# Patient Record
Sex: FEMALE | Race: WHITE | Employment: FULL TIME | URBAN - METROPOLITAN AREA
[De-identification: names, ages, dates, MRNs, and addresses within clinical notes are randomized per-mention and may not be internally consistent; named-entity substitution may affect disease eponyms.]

---

## 2017-01-17 ENCOUNTER — TELEPHONE (OUTPATIENT)
Dept: GASTROENTEROLOGY | Facility: OUTPATIENT CENTER | Age: 57
End: 2017-01-17

## 2017-01-17 ENCOUNTER — HOSPITAL ENCOUNTER (OUTPATIENT)
Dept: MAMMOGRAPHY | Facility: CLINIC | Age: 57
Discharge: HOME | End: 2017-01-17
Attending: FAMILY MEDICINE | Admitting: FAMILY MEDICINE
Payer: COMMERCIAL

## 2017-01-17 DIAGNOSIS — Z12.31 ENCOUNTER FOR SCREENING MAMMOGRAM FOR BREAST CANCER: ICD-10-CM

## 2017-01-17 DIAGNOSIS — Z12.11 ENCOUNTER FOR SCREENING COLONOSCOPY: Primary | ICD-10-CM

## 2017-01-17 PROCEDURE — G0202 SCR MAMMO BI INCL CAD: HCPCS

## 2017-01-17 NOTE — TELEPHONE ENCOUNTER
Patient taking any blood thinners ? no    Heart disease ? denies    Lung disease ? denies      Sleep apnea ? denies    Diabetic ? denies    Kidney disease ? denies    Dialysis ? n/a    Electronic implanted medical devices ? denies    Are you taking any narcotic pain medication ? no   What is your daily dosage ?    PTSD ? n/a    Prep instructions reviewed with patient ? Pt. Declined review.  policy, MAC sedation plan reviewed. Advised pt. To have someone stay with her post exam    Pharmacy : [facility]    Indication for procedure : Screening colonoscopy    Referring provider : Dr. Dung marley

## 2017-01-19 ENCOUNTER — HOSPITAL ENCOUNTER (OUTPATIENT)
Dept: MAMMOGRAPHY | Facility: CLINIC | Age: 57
Discharge: HOME | End: 2017-01-19
Attending: FAMILY MEDICINE | Admitting: FAMILY MEDICINE
Payer: COMMERCIAL

## 2017-01-19 ENCOUNTER — HOSPITAL ENCOUNTER (OUTPATIENT)
Dept: MAMMOGRAPHY | Facility: CLINIC | Age: 57
End: 2017-01-19
Attending: FAMILY MEDICINE
Payer: COMMERCIAL

## 2017-01-19 DIAGNOSIS — R92.8 ABNORMAL MAMMOGRAM: ICD-10-CM

## 2017-01-19 PROCEDURE — 76642 ULTRASOUND BREAST LIMITED: CPT | Mod: RT

## 2017-01-19 PROCEDURE — 77061 BREAST TOMOSYNTHESIS UNI: CPT

## 2017-01-23 ENCOUNTER — TELEPHONE (OUTPATIENT)
Dept: FAMILY MEDICINE | Facility: CLINIC | Age: 57
End: 2017-01-23

## 2017-01-23 NOTE — TELEPHONE ENCOUNTER
Called patient to make sure she knew about following up in 6 months for US of breast to follow up on mass in R breast.  Patient was told at time of US and is aware.

## 2017-01-24 ENCOUNTER — TRANSFERRED RECORDS (OUTPATIENT)
Dept: HEALTH INFORMATION MANAGEMENT | Facility: CLINIC | Age: 57
End: 2017-01-24

## 2017-05-09 ENCOUNTER — OFFICE VISIT (OUTPATIENT)
Dept: FAMILY MEDICINE | Facility: CLINIC | Age: 57
End: 2017-05-09

## 2017-05-09 VITALS
RESPIRATION RATE: 16 BRPM | HEIGHT: 63 IN | DIASTOLIC BLOOD PRESSURE: 102 MMHG | OXYGEN SATURATION: 98 % | HEART RATE: 88 BPM | SYSTOLIC BLOOD PRESSURE: 150 MMHG | BODY MASS INDEX: 36.32 KG/M2 | WEIGHT: 205 LBS

## 2017-05-09 DIAGNOSIS — I10 ESSENTIAL HYPERTENSION WITH GOAL BLOOD PRESSURE LESS THAN 130/80: Primary | ICD-10-CM

## 2017-05-09 PROCEDURE — 99213 OFFICE O/P EST LOW 20 MIN: CPT | Performed by: FAMILY MEDICINE

## 2017-05-09 RX ORDER — HYDROCHLOROTHIAZIDE 25 MG/1
25 TABLET ORAL DAILY
Qty: 90 TABLET | Refills: 3 | Status: SHIPPED | OUTPATIENT
Start: 2017-05-09 | End: 2018-04-18

## 2017-05-09 NOTE — PROGRESS NOTES
Subjective:  Here to discuss the status of the following problem(s):(Unless noted the problem(s) is/are stable and if applicable the medicine(s) being used is/are causing no side effects or problems.)    CC: Hypertension and Recheck Medication      1. Essential hypertension with goal blood pressure less than 130/80  On losartan   Not checking bp no side effects        ROS:  General and CV completed and negative except as noted above    Past Medical History:   Diagnosis Date     Abnormal liver function test 2015    very slight abnormality     CARDIOVASCULAR SCREENING; LDL GOAL LESS THAN 160      Obesity (BMI 30-39.9)      Shift work sleep disorder 2015    saw sleep  [facility] [place]     Current Outpatient Prescriptions   Medication     Multiple Vitamins-Minerals (MULTIVITAMIN GUMMIES ADULT PO)     losartan (COZAAR) 100 MG tablet     No current facility-administered medications for this visit.       reports that she has never smoked. She has never used smokeless tobacco. She reports that she drinks alcohol. She reports that she does not use illicit drugs.      EXAM:  BP: 150/102   Pulse: 88      Wt Readings from Last 2 Encounters:   05/09/17 93 kg (205 lb)   11/15/16 87.5 kg (193 lb)       BMI= Body mass index is 36.9 kg/(m^2).    EXAM:   APPEARANCE: = alert, no distress and over weight  Conj/Eyelids = Nrl  Ears/Nose = Nrl  Neck = Nrl  Resp effort = Nrl  Breath Sounds = Nrl  Heart Rate, Rythym, & sounds (no Murm)  = Nrl  Digits and Nails =Nrl  SKIN absent significant rashes or lesions = Nrl  Recent/Remote Memory = Nrl  Mood/Affect = Nrl  Ext trace edema  Assessment/Plan:  Vera was seen today for hypertension and recheck medication.    Diagnoses and all orders for this visit    (I10) Essential hypertension with goal blood pressure less than 130/80  (primary encounter diagnosis)  Add to losartan 100 mg:   Plan: hydrochlorothiazide (HYDRODIURIL) 25 MG tablet,        Basic Metabolic Panel (8) ([facility])  discusse drenal and  potassium issue she assures me she will follow up for her lab testing in 2 weeks, see me in 4 weeks.   Girish Roberts  [facility]

## 2017-05-09 NOTE — MR AVS SNAPSHOT
After Visit Summary   5/9/2017    Vera Arellano    MRN: [id number]           Patient Information     Date Of Birth          1960        Visit Information        Provider Department      5/9/2017 12:45 PM Dung Roberts MD [facility]        Today's Diagnoses     Essential hypertension with goal blood pressure less than 130/80    -  1      Care Instructions      Taking Your Blood Pressure  Blood pressure is the force of blood as it moves from the heart through the blood vessels. You can take your own blood pressure reading using a digital monitor. Take readings as often as your doctor instructs. Take each reading at the same time of day.  Step 1. Relax      Wait at least a half-hour after smoking, eating, or exercising.    Sit comfortably at a table. Place the monitor near you.    Rest for a few minutes before you begin.  Step 2. Wrap the Cuff      Place your arm on the table, palm up. Your arm should be at the level of your heart. Wrap the cuff around your upper arm, just above your elbow. It s best done on bare skin, not over clothing.    Make sure your cuff fits. If it doesn t wrap around your upper arm, order a larger cuff.  Step 3. Inflate the Cuff      Pump the cuff until the scale reads 160. If you have a self-inflating cuff, push the button that starts the pump.    The cuff will tighten, then loosen.    The numbers will change. When they stop changing, your blood pressure reading will appear.    If you get a reading that is too high or too low for you, relax for a few minutes. Then do the test again.  Step 4. Write Down the Results      Write down your blood pressure numbers. Note the date and time. Keep your results in one place, such as a notebook.    Remove the cuff from your arm. Turn off the machine.    0845-8425 [employer]. [address] [place] [address], [place] [address]. All rights reserved. This information is not intended as a substitute for professional  "medical care. Always follow your healthcare professional's instructions.              Follow-ups after your visit        Your next 10 appointments already scheduled     Jun 06, 2017  8:30 AM CDT   [facility]    [address] [facility] [address] [phone]           Bring a current list of meds and any records pertaining to this visit.  For Physicals, please bring immunization records and any forms needing to be filled out.  Please arrive 10 minutes early to complete paperwork.              Future tests that were ordered for you today     Open Future Orders        Priority Expected Expires Ordered    Basic Metabolic Panel (8) ([facility]) Routine 5/23/2017 5/24/2017 5/9/2017            Who to contact     If you have questions or need follow up information about today's clinic visit or your schedule please contact [facility] directly at [phone].  Normal or non-critical lab and imaging results will be communicated to you by [employer], letter or phone within 4 business days after the clinic has received the results. If you do not hear from us within 7 days, please contact the clinic through [email or web address] or phone. If you have a critical or abnormal lab result, we will notify you by phone as soon as possible.  Submit refill requests through "[employer] or call your pharmacy and they will forward the refill request to us. Please allow 3 business days for your refill to be completed.          Additional Information About Your Visit        "[employer] Information     "[employer] lets you send messages to your doctor, view your test results, renew your prescriptions, schedule appointments and more. To sign up, go to [email or web address] . Click on \"Log in\" on the left side of the screen, which will take you to the Welcome page. Then click on \"Sign up Now\" on the right side of the page.     You will be asked to enter the " "access code listed below, as well as some personal information. Please follow the directions to create your username and password.     Your access code is: [email or web address]  Expires: 2017  1:22 PM     Your access code will  in 90 days. If you need help or a new code, please call your [facility] clinic or [phone].        Care EveryWhere ID     This is your Care EveryWhere ID. This could be used by other organizations to access your [facility] medical records  [id number]        Your Vitals Were     Pulse Respirations Height Last Period Pulse Oximetry BMI (Body Mass Index)    88 16 1.588 m (5' 2.5\") 2012 98% 36.9 kg/m2       Blood Pressure from Last 3 Encounters:   17 (!) 150/102   11/15/16 140/90   07/13/15 140/90    Weight from Last 3 Encounters:   17 93 kg (205 lb)   11/15/16 87.5 kg (193 lb)   07/13/15 95.6 kg (210 lb 12.8 oz)                 Today's Medication Changes          These changes are accurate as of: 17  1:22 PM.  If you have any questions, ask your nurse or doctor.               Start taking these medicines.        Dose/Directions    hydrochlorothiazide 25 MG tablet   Commonly known as:  HYDRODIURIL   Used for:  Essential hypertension with goal blood pressure less than 130/80   Started by:  Dung Roberts MD        Dose:  25 mg   Take 1 tablet (25 mg) by mouth daily   Quantity:  90 tablet   Refills:  3            Where to get your medicines      These medications were sent to [facility] - [address]  [address] [place] [address] [address]     Phone:  [phone]     hydrochlorothiazide 25 MG tablet                Primary Care Provider Office Phone # Fax #    Dung Roberts [phone]       [facility] [address]  [facility] [address]        Thank you!     Thank you for choosing [facility]  for your care. Our goal is always to provide you with excellent care. Hearing back from our " patients is one way we can continue to improve our services. Please take a few minutes to complete the written survey that you may receive in the mail after your visit with us. Thank you!             Your Updated Medication List - Protect others around you: Learn how to safely use, store and throw away your medicines at www.disposemymeds.org.          This list is accurate as of: 5/9/17  1:22 PM.  Always use your most recent med list.                   Brand Name Dispense Instructions for use    hydrochlorothiazide 25 MG tablet    HYDRODIURIL    90 tablet    Take 1 tablet (25 mg) by mouth daily       losartan 100 MG tablet    COZAAR    90 tablet    Take 1 tablet (100 mg) by mouth daily       MULTIVITAMIN GUMMIES ADULT PO      Take by mouth daily

## 2017-05-09 NOTE — PATIENT INSTRUCTIONS
Taking Your Blood Pressure  Blood pressure is the force of blood as it moves from the heart through the blood vessels. You can take your own blood pressure reading using a digital monitor. Take readings as often as your doctor instructs. Take each reading at the same time of day.  Step 1. Relax      Wait at least a half-hour after smoking, eating, or exercising.    Sit comfortably at a table. Place the monitor near you.    Rest for a few minutes before you begin.  Step 2. Wrap the Cuff      Place your arm on the table, palm up. Your arm should be at the level of your heart. Wrap the cuff around your upper arm, just above your elbow. It s best done on bare skin, not over clothing.    Make sure your cuff fits. If it doesn t wrap around your upper arm, order a larger cuff.  Step 3. Inflate the Cuff      Pump the cuff until the scale reads 160. If you have a self-inflating cuff, push the button that starts the pump.    The cuff will tighten, then loosen.    The numbers will change. When they stop changing, your blood pressure reading will appear.    If you get a reading that is too high or too low for you, relax for a few minutes. Then do the test again.  Step 4. Write Down the Results      Write down your blood pressure numbers. Note the date and time. Keep your results in one place, such as a notebook.    Remove the cuff from your arm. Turn off the machine.    3643-0647 [employer]. [address] [place] [address], [place] [address]. All rights reserved. This information is not intended as a substitute for professional medical care. Always follow your healthcare professional's instructions.

## 2017-05-19 DIAGNOSIS — I10 ESSENTIAL HYPERTENSION WITH GOAL BLOOD PRESSURE LESS THAN 130/80: ICD-10-CM

## 2017-05-19 PROCEDURE — 36415 COLL VENOUS BLD VENIPUNCTURE: CPT | Performed by: FAMILY MEDICINE

## 2017-05-19 PROCEDURE — 80048 BASIC METABOLIC PNL TOTAL CA: CPT | Mod: 90 | Performed by: FAMILY MEDICINE

## 2017-05-19 NOTE — LETTER
[facility] [address] [place]  [address]  Phone: [phone]    May 22, 2017      Vera Arellano  [address] [facility] [address]              Dear Vera,    The labs show normal potassium and kidney function. I will see you in a few weeks.         Sincerely,     Dung Mathews M.D.    Results for orders placed or performed in visit on 05/19/17   Basic Metabolic Panel (8) ([facility])   Result Value Ref Range    Glucose 91 65 - 99 mg/dL    Urea Nitrogen 16 6 - 24 mg/dL    Creatinine 0.92 0.57 - 1.00 mg/dL    eGFR If NonAfricn Am 71 >59 mL/min/1.73    eGFR If Africn Am 82 >59 mL/min/1.73    BUN/Creatinine Ratio 17 9 - 23    Sodium 138 134 - 144 mmol/L    Potassium 4.0 3.5 - 5.2 mmol/L    Chloride 95 (L) 96 - 106 mmol/L    Total CO2 27 18 - 28 mmol/L    Calcium 9.3 8.7 - 10.2 mg/dL    Narrative    Performed at:  01 - [facility] [address], [place], [place]  [id number]  : Yusuf Bergeron MD, Phone:  [phone]

## 2017-05-20 LAB
BUN SERPL-MCNC: 16 MG/DL (ref 6–24)
BUN/CREATININE RATIO: 17 (ref 9–23)
CALCIUM SERPL-MCNC: 9.3 MG/DL (ref 8.7–10.2)
CHLORIDE SERPLBLD-SCNC: 95 MMOL/L (ref 96–106)
CREAT SERPL-MCNC: 0.92 MG/DL (ref 0.57–1)
EGFR IF AFRICN AM: 82 ML/MIN/1.73
EGFR IF NONAFRICN AM: 71 ML/MIN/1.73
GLUCOSE SERPL-MCNC: 91 MG/DL (ref 65–99)
POTASSIUM SERPL-SCNC: 4 MMOL/L (ref 3.5–5.2)
SODIUM SERPL-SCNC: 138 MMOL/L (ref 134–144)
TOTAL CO2: 27 MMOL/L (ref 18–28)

## 2017-06-20 ENCOUNTER — OFFICE VISIT (OUTPATIENT)
Dept: FAMILY MEDICINE | Facility: CLINIC | Age: 57
End: 2017-06-20

## 2017-06-20 VITALS
BODY MASS INDEX: 36.43 KG/M2 | SYSTOLIC BLOOD PRESSURE: 122 MMHG | OXYGEN SATURATION: 98 % | HEART RATE: 92 BPM | WEIGHT: 202.4 LBS | RESPIRATION RATE: 16 BRPM | DIASTOLIC BLOOD PRESSURE: 82 MMHG

## 2017-06-20 DIAGNOSIS — I10 ESSENTIAL HYPERTENSION WITH GOAL BLOOD PRESSURE LESS THAN 130/80: Primary | ICD-10-CM

## 2017-06-20 PROCEDURE — 99213 OFFICE O/P EST LOW 20 MIN: CPT | Performed by: FAMILY MEDICINE

## 2017-06-20 NOTE — MR AVS SNAPSHOT
"              After Visit Summary   6/20/2017    Vera Arellano    MRN: [id number]           Patient Information     Date Of Birth          1960        Visit Information        Provider Department      6/20/2017 12:00 PM Dung Roberts MD [facility]        Today's Diagnoses     Essential hypertension with goal blood pressure less than 130/80    -  1       Follow-ups after your visit        Your next 10 appointments already scheduled     Jun 20, 2017  4:00 PM CDT   [facility]cell [facility]   [facility]    [address] [facility] [address] [phone]           Bring a current list of meds and any records pertaining to this visit.  For Physicals, please bring immunization records and any forms needing to be filled out.  Please arrive 10 minutes early to complete paperwork.              Who to contact     If you have questions or need follow up information about today's clinic visit or your schedule please contact [facility] directly at [phone].  Normal or non-critical lab and imaging results will be communicated to you by "[employer]hart, letter or phone within 4 business days after the clinic has received the results. If you do not hear from us within 7 days, please contact the clinic through [employer] or phone. If you have a critical or abnormal lab result, we will notify you by phone as soon as possible.  Submit refill requests through [employer] or call your pharmacy and they will forward the refill request to us. Please allow 3 business days for your refill to be completed.          Additional Information About Your Visit        MyChart Information     [employer] lets you send messages to your doctor, view your test results, renew your prescriptions, schedule appointments and more. To sign up, go to [email or web address] . Click on \"Log in\" on the left side of the screen, which will take you to the Welcome page. " "Then click on \"Sign up Now\" on the right side of the page.     You will be asked to enter the access code listed below, as well as some personal information. Please follow the directions to create your username and password.     Your access code is: [email or web address]  Expires: 2017  1:22 PM     Your access code will  in 90 days. If you need help or a new code, please call your [facility] clinic or [phone].        Care EveryWhere ID     This is your Care EveryWhere ID. This could be used by other organizations to access your [facility] medical records  [id number]        Your Vitals Were     Pulse Respirations Last Period Pulse Oximetry BMI (Body Mass Index)       92 16 2012 98% 36.43 kg/m2        Blood Pressure from Last 3 Encounters:   17 122/82   17 (!) 150/102   11/15/16 140/90    Weight from Last 3 Encounters:   17 91.8 kg (202 lb 6.4 oz)   17 93 kg (205 lb)   11/15/16 87.5 kg (193 lb)              Today, you had the following     No orders found for display       Primary Care Provider Office Phone # Fax #    Dung Roberts [phone]       [facility] [address] [facility] [address]        Thank you!     Thank you for choosing [facility]  for your care. Our goal is always to provide you with excellent care. Hearing back from our patients is one way we can continue to improve our services. Please take a few minutes to complete the written survey that you may receive in the mail after your visit with us. Thank you!             Your Updated Medication List - Protect others around you: Learn how to safely use, store and throw away your medicines at www.disposemymeds.org.          This list is accurate as of: 17 12:15 PM.  Always use your most recent med list.                   Brand Name Dispense Instructions for use    hydrochlorothiazide 25 MG tablet    HYDRODIURIL    90 tablet    Take 1 tablet (25 mg) by mouth daily    "    losartan 100 MG tablet    COZAAR    90 tablet    Take 1 tablet (100 mg) by mouth daily       MULTIVITAMIN GUMMIES ADULT PO      Take by mouth daily

## 2017-06-20 NOTE — PROGRESS NOTES
Subjective:  Here to discuss the status of the following problem(s):(Unless noted the problem(s) is/are stable and if applicable the medicine(s) being used is/are causing no side effects or problems.)    CC: Hypertension and Recheck Medication      1. Essential hypertension with goal blood pressure less than 130/80  She checks and it is < 140 /70  No problemms with meds , dizzy etc.       ROS:  General and CV completed and negative except as noted above    Past Medical History:   Diagnosis Date     Abnormal liver function test 2015    very slight abnormality     CARDIOVASCULAR SCREENING; LDL GOAL LESS THAN 160      Obesity (BMI 30-39.9)      Shift work sleep disorder 2015    saw sleep  [facility] [place]     Current Outpatient Prescriptions   Medication     hydrochlorothiazide (HYDRODIURIL) 25 MG tablet     Multiple Vitamins-Minerals (MULTIVITAMIN GUMMIES ADULT PO)     losartan (COZAAR) 100 MG tablet     No current facility-administered medications for this visit.       reports that she has never smoked. She has never used smokeless tobacco. She reports that she drinks alcohol. She reports that she does not use illicit drugs.      EXAM:  BP: 122/82   Pulse: 92      Wt Readings from Last 2 Encounters:   06/20/17 91.8 kg (202 lb 6.4 oz)   05/09/17 93 kg (205 lb)       BMI= Body mass index is 36.43 kg/(m^2).    EXAM:   APPEARANCE: = healthy, alert, no distress and over weight  Conj/Eyelids = Nrl  Ears/Nose = Nrl  Neck = Nrl  Resp effort = Nrl  SKIN absent significant rashes or lesions = Nrl  Recent/Remote Memory = Nrl  Mood/Affect = Nrl    Assessment/Plan:  Vera was seen today for hypertension and recheck medication.    Diagnoses and all orders for this visit:    Essential hypertension with goal blood pressure less than 130/80    BP GOOD CONTROL , FOLLOW UP FOR ANNUAL EXAM.   ALSO GET SHORT TERM MAMM AS REC.          Dung Roberts  [facility]

## 2017-07-12 ENCOUNTER — HOSPITAL ENCOUNTER (OUTPATIENT)
Dept: MAMMOGRAPHY | Facility: CLINIC | Age: 57
Discharge: HOME | End: 2017-07-12
Attending: FAMILY MEDICINE | Admitting: FAMILY MEDICINE
Payer: COMMERCIAL

## 2017-07-12 DIAGNOSIS — N63.10 BREAST MASS, RIGHT: ICD-10-CM

## 2017-07-12 PROCEDURE — 76642 ULTRASOUND BREAST LIMITED: CPT | Mod: RT

## 2017-09-25 ENCOUNTER — OFFICE VISIT (OUTPATIENT)
Dept: FAMILY MEDICINE | Facility: CLINIC | Age: 57
End: 2017-09-25

## 2017-09-25 VITALS
TEMPERATURE: 98.2 F | OXYGEN SATURATION: 98 % | RESPIRATION RATE: 16 BRPM | SYSTOLIC BLOOD PRESSURE: 150 MMHG | HEIGHT: 63 IN | BODY MASS INDEX: 35.79 KG/M2 | WEIGHT: 202 LBS | DIASTOLIC BLOOD PRESSURE: 104 MMHG | HEART RATE: 106 BPM

## 2017-09-25 DIAGNOSIS — I10 ESSENTIAL HYPERTENSION WITH GOAL BLOOD PRESSURE LESS THAN 130/80: ICD-10-CM

## 2017-09-25 DIAGNOSIS — R05.9 COUGH: ICD-10-CM

## 2017-09-25 DIAGNOSIS — H61.21 IMPACTED CERUMEN OF RIGHT EAR: Primary | ICD-10-CM

## 2017-09-25 PROCEDURE — 99214 OFFICE O/P EST MOD 30 MIN: CPT | Performed by: FAMILY MEDICINE

## 2017-09-25 RX ORDER — BENZONATATE 200 MG/1
200 CAPSULE ORAL 3 TIMES DAILY PRN
Qty: 21 CAPSULE | Refills: 0 | Status: SHIPPED | OUTPATIENT
Start: 2017-09-25 | End: 2019-10-08

## 2017-09-25 RX ORDER — AZITHROMYCIN 250 MG/1
TABLET, FILM COATED ORAL
Qty: 6 TABLET | Refills: 0 | Status: SHIPPED | OUTPATIENT
Start: 2017-09-25 | End: 2018-07-24

## 2017-09-25 NOTE — MR AVS SNAPSHOT
"              After Visit Summary   9/25/2017    Vera Arellano    MRN: [id number]           Patient Information     Date Of Birth          1960        Visit Information        Provider Department      9/25/2017 1:30 PM Edith Girard MD [facility]        Today's Diagnoses     Impacted cerumen of right ear    -  1    Cough          Care Instructions    zpack    Right ear wash    F/u 2 weeks if not better  And get CBC/CXR    Schedule your pap               Follow-ups after your visit        Future tests that were ordered for you today     Open Future Orders        Priority Expected Expires Ordered    CBC with Diff/Plt ([facility]) Routine  9/25/2018 9/25/2017    X-ray Chest 2 vws* Routine 9/25/2017 9/25/2018 9/25/2017            Who to contact     If you have questions or need follow up information about today's clinic visit or your schedule please contact [facility] directly at [phone].  Normal or non-critical lab and imaging results will be communicated to you by [employer], letter or phone within 4 business days after the clinic has received the results. If you do not hear from us within 7 days, please contact the clinic through [employer] or phone. If you have a critical or abnormal lab result, we will notify you by phone as soon as possible.  Submit refill requests through [employer] or call your pharmacy and they will forward the refill request to us. Please allow 3 business days for your refill to be completed.          Additional Information About Your Visit        [employer] Information     [employer] lets you send messages to your doctor, view your test results, renew your prescriptions, schedule appointments and more. To sign up, go to [email or web address] . Click on \"Log in\" on the left side of the screen, which will take you to the Welcome page. Then click on \"Sign up Now\" on the right side of the page.     You will be asked to enter the access code listed below, as well as some " "personal information. Please follow the directions to create your username and password.     Your access code is: [email or web address]  Expires: 2017  1:57 PM     Your access code will  in 90 days. If you need help or a new code, please call your [place] clinic or [phone].        Care EveryWhere ID     This is your Care EveryWhere ID. This could be used by other organizations to access your [place] medical records  [id number]        Your Vitals Were     Pulse Temperature Respirations Height Last Period Pulse Oximetry    106 98.2  F (36.8  C) (Oral) 16 1.588 m (5' 2.5\") 2012 98%    BMI (Body Mass Index)                   36.36 kg/m2            Blood Pressure from Last 3 Encounters:   17 (!) 150/104   17 122/82   17 (!) 150/102    Weight from Last 3 Encounters:   17 91.6 kg (202 lb)   17 91.8 kg (202 lb 6.4 oz)   17 93 kg (205 lb)              We Performed the Following     Removal Impacted Cerumen Irrigation Unilateral (Ear Wash)          Today's Medication Changes          These changes are accurate as of: 17  1:57 PM.  If you have any questions, ask your nurse or doctor.               Start taking these medicines.        Dose/Directions    azithromycin 250 MG tablet   Commonly known as:  ZITHROMAX   Used for:  Cough   Started by:  Edith Girard MD        Two tablets first day, then one tablet daily for four days.   Quantity:  6 tablet   Refills:  0       benzonatate 200 MG capsule   Commonly known as:  TESSALON   Used for:  Cough   Started by:  Edith Girard MD        Dose:  200 mg   Take 1 capsule (200 mg) by mouth 3 times daily as needed for cough   Quantity:  21 capsule   Refills:  0            Where to get your medicines      These medications were sent to [facility] - [address]  [address] [place] [address]     Phone:  [phone]     azithromycin 250 MG tablet    benzonatate 200 MG " capsule                Primary Care Provider Office Phone # Fax #    Dung Roberts [phone] [address]  [facility] [address]        Equal Access to Services     PAPA MATAMOROS : Hadjeremy kamini alejo miguelinao Soomaali, waaxda luqadaha, qaybta kaalmada adeegyada, donna wubernardo spaulding. So [facility] [phone].    ATENCIÓN: Si habla español, tiene a barber disposición servicios gratuitos de asistencia lingüística. Llame al [phone].    We comply with applicable federal civil rights laws and [place] laws. We do not discriminate on the basis of race, color, national origin, age, disability sex, sexual orientation or gender identity.            Thank you!     Thank you for choosing [facility]  for your care. Our goal is always to provide you with excellent care. Hearing back from our patients is one way we can continue to improve our services. Please take a few minutes to complete the written survey that you may receive in the mail after your visit with us. Thank you!             Your Updated Medication List - Protect others around you: Learn how to safely use, store and throw away your medicines at www.disposemymeds.org.          This list is accurate as of: 9/25/17  1:57 PM.  Always use your most recent med list.                   Brand Name Dispense Instructions for use Diagnosis    azithromycin 250 MG tablet    ZITHROMAX    6 tablet    Two tablets first day, then one tablet daily for four days.    Cough       benzonatate 200 MG capsule    TESSALON    21 capsule    Take 1 capsule (200 mg) by mouth 3 times daily as needed for cough    Cough       hydrochlorothiazide 25 MG tablet    HYDRODIURIL    90 tablet    Take 1 tablet (25 mg) by mouth daily    Essential hypertension with goal blood pressure less than 130/80       losartan 100 MG tablet    COZAAR    90 tablet    Take 1 tablet (100 mg) by mouth daily    Essential hypertension with goal blood pressure less than  130/80       MULTIVITAMIN GUMMIES ADULT PO      Take by mouth daily

## 2017-09-25 NOTE — PROGRESS NOTES
Cc cold for one month    HCM  Living will- Declined   Flu vaccine- declined, pt reports that she felt sick, nauseated with vomiting after getting flu vaccine, about 2 years ago.   Pap due - patient states that she discussed with Dr. Roberts and will get one next year.   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[employer]  Hobbies: reading, dog park  Travel no  Exposure no    RESPIRATORY SYMPTOMS- non smoker; non-productive cough with fatigue      Duration: 2 months     Description  Dry nasal congestion, rhinorrhea, dry cough, fatigue/malaise and hoarse voice, sleep issues due to cough which occurs intermittent, appetite - normal, no fevers, no N/V/D, no wheezing, occasional SOB when coughing     Severity: moderate    Accompanying signs and symptoms: see above     History (predisposing factors):  None, no COPD, no asthma, never smoker     Precipitating or alleviating factors: None    Therapies tried and outcome:  Nyquil - not effective     10 years ago similar episode. Pertussis. Zpack.    Never smoked    ETOH 2 times a month some wine with dinner  Street drugs/MJ no  Caffeine yes 2 coffee/day    Sleep 4-8 hours, nocturia no  Appetite ok  Exercise: Walking with dog, cannot walk as long.    Very tired.  Night sweats no  Weight loss no            Problem list and histories reviewed & adjusted, as indicated.  Additional history: as documented    Patient Active Problem List   Diagnosis     Obesity     Essential hypertension with goal blood pressure less than 130/80     Past Surgical History:   Procedure Laterality Date     BUNIONECTOMY      in  her 20s       Social History   Substance Use Topics     Smoking status: Never Smoker     Smokeless tobacco: Never Used     Alcohol use 0.0 - 1.2 oz/week     0 - 2 Standard drinks or equivalent per week      Comment: once per month     Family History   Problem  "Relation Age of Onset     Asthma Mother      house fire history long before that     Other - See Comments Sister      suicide     Chronic Obstructive Pulmonary Disease Brother      Chronic Obstructive Pulmonary Disease Brother          Current Outpatient Prescriptions   Medication Sig Dispense Refill     hydrochlorothiazide (HYDRODIURIL) 25 MG tablet Take 1 tablet (25 mg) by mouth daily 90 tablet 3     Multiple Vitamins-Minerals (MULTIVITAMIN GUMMIES ADULT PO) Take by mouth daily       losartan (COZAAR) 100 MG tablet Take 1 tablet (100 mg) by mouth daily 90 tablet 3     Allergies   Allergen Reactions     Codeine Nausea and Vomiting     Morphine Nausea and Vomiting     Percocet [Oxycodone-Acetaminophen] Nausea and Vomiting     Lisinopril Cough     Recent Labs   Lab Test  05/19/17   1612  11/15/16   1400  04/13/15   1643  03/02/15   0907  03/02/15   0901   01/26/12   0942   A1C   --    --   5.9   --    --    --    --    LDL   --   172*   --   124*   --    --   153*   HDL   --   68   --   66   --    --   57   TRIG   --   110   --   149   --    --   89   ALT   --   25  63*   --   29   --    --    CR  0.92  0.86   --    --   0.70   < >   --    POTASSIUM  4.0  4.4   --    --   4.3   < >   --    TSH   --    --    --    --    --    --   1.71    < > = values in this interval not displayed.      BP Readings from Last 3 Encounters:   09/25/17 (!) 150/104   06/20/17 122/82   05/09/17 (!) 150/102    Wt Readings from Last 3 Encounters:   09/25/17 91.6 kg (202 lb)   06/20/17 91.8 kg (202 lb 6.4 oz)   05/09/17 93 kg (205 lb)       Estimated body mass index is 36.36 kg/(m^2) as calculated from the following:    Height as of this encounter: 1.588 m (5' 2.5\").    Weight as of this encounter: 91.6 kg (202 lb).  Weight loss is recommended    Needs to recheck BP 1-2 weeks- not at goal 140/90 or less.           Labs reviewed in EPIC          Reviewed and updated as needed this visit by clinical staff     Reviewed and updated as needed " "this visit by Provider         ROS:  Constitutional, HEENT, cardiovascular, pulmonary, GI, , musculoskeletal, neuro, skin, endocrine and psych systems are negative, except as otherwise noted.  Dry cough    Postmenopausal      OBJECTIVE:   LMP 08/17/2012 BP (!) 150/104  Pulse 106  Temp 98.2  F (36.8  C) (Oral)  Resp 16  Ht 1.588 m (5' 2.5\")  Wt 91.6 kg (202 lb)  LMP 08/17/2012  SpO2 98%  BMI 36.36 kg/m2    There is no height or weight on file to calculate BMI.  GENERAL: healthy, alert and no distress  EYES: Eyes grossly normal to inspection, PERRL and conjunctivae and sclerae normal  HENT: ear canals and TM's normal left and cerumen right, nose and mouth without ulcers or lesions  NECK: no adenopathy, no asymmetry, masses, or scars and thyroid normal to palpation  RESP: lungs clear to auscultation - no rales, rhonchi or wheezes Deep cough heard once or twice during exam period.  CV: regular rate and rhythm, normal S1 S2, no S3 or S4, no murmur, click or rub, no peripheral edema and peripheral pulses strong  No krystal/cords  ABDOMEN: soft, nontender, no hepatosplenomegaly, no masses and bowel sounds normal  MS: no gross musculoskeletal defects noted, no edema  SKIN: no suspicious lesions or rashes  NEURO: Normal strength and tone, mentation intact and speech normal  PSYCH: mentation appears normal, affect normal/bright  LYMPH: no cervical, supraclavicular, axillary, or inguinal adenopathy    Diagnostic Test Results:  Results for orders placed or performed during the hospital encounter of 07/12/17   US Breast Right    Narrative    Right breast ultrasound    Comparisons: 1/19/2017    History: Follow-up of probably benign findings in the right breast.    FINDINGS:     Focused ultrasound of the right breast was performed by  radiologist and technologist.    Small hypoechoic oval circumscribed mass is again seen at the 7:00  position, 4 cm from the nipple on the right. This is not significantly  changed since " 1/19/2017.      Impression    IMPRESSION: BI-RADS CATEGORY: 3 - Probably Benign Finding-Short  Interval Follow-Up Suggested    RECOMMENDED FOLLOW-UP: Short Interval Follow-up 6 Months.    Follow-up with repeat right breast ultrasound at the time of patient's  routine annual bilateral mammography.    The patient was given the results of the examination.    FLORAEBONY SHARMA       ASSESSMENT/PLAN:     ASSESSMENT / PLAN:  (H61.21) Impacted cerumen of right ear  (primary encounter diagnosis)  Comment:   Plan: Removal Impacted Cerumen Irrigation Unilateral         (Ear Wash)            (R05) Cough  Comment: she will return for lab and xray if not better in 2 weeks  Plan: CBC with Diff/Plt ([facility]), X-ray Chest 2 vws*,         azithromycin (ZITHROMAX) 250 MG tablet,         benzonatate (TESSALON) 200 MG capsule            Patient instructions:  zpack    Right ear wash    F/u 2 weeks if not better  And get CBC/CXR    Schedule your pap     Needs to recheck BP 1-2 weeks- not at goal 140/90 or less.        Edith Girard MD  [facility]

## 2017-11-19 ENCOUNTER — HEALTH MAINTENANCE LETTER (OUTPATIENT)
Age: 57
End: 2017-11-19

## 2018-01-09 DIAGNOSIS — I10 ESSENTIAL HYPERTENSION WITH GOAL BLOOD PRESSURE LESS THAN 130/80: ICD-10-CM

## 2018-01-11 RX ORDER — LOSARTAN POTASSIUM 100 MG/1
100 TABLET ORAL DAILY
Qty: 90 TABLET | Refills: 3 | Status: SHIPPED | OUTPATIENT
Start: 2018-01-11 | End: 2018-07-24

## 2018-04-18 DIAGNOSIS — I10 ESSENTIAL HYPERTENSION WITH GOAL BLOOD PRESSURE LESS THAN 130/80: ICD-10-CM

## 2018-04-18 RX ORDER — HYDROCHLOROTHIAZIDE 25 MG/1
25 TABLET ORAL DAILY
Qty: 30 TABLET | Refills: 0 | Status: SHIPPED | OUTPATIENT
Start: 2018-04-18 | End: 2018-07-24 | Stop reason: SINTOL

## 2018-07-24 ENCOUNTER — OFFICE VISIT (OUTPATIENT)
Dept: FAMILY MEDICINE | Facility: CLINIC | Age: 58
End: 2018-07-24

## 2018-07-24 VITALS
RESPIRATION RATE: 16 BRPM | HEIGHT: 63 IN | WEIGHT: 192 LBS | HEART RATE: 76 BPM | BODY MASS INDEX: 34.02 KG/M2 | DIASTOLIC BLOOD PRESSURE: 72 MMHG | OXYGEN SATURATION: 96 % | SYSTOLIC BLOOD PRESSURE: 124 MMHG

## 2018-07-24 DIAGNOSIS — I10 ESSENTIAL HYPERTENSION WITH GOAL BLOOD PRESSURE LESS THAN 130/80: ICD-10-CM

## 2018-07-24 DIAGNOSIS — Z12.4 PAP SMEAR FOR CERVICAL CANCER SCREENING: ICD-10-CM

## 2018-07-24 DIAGNOSIS — Z00.00 ROUTINE GENERAL MEDICAL EXAMINATION AT A HEALTH CARE FACILITY: Primary | ICD-10-CM

## 2018-07-24 PROCEDURE — 99396 PREV VISIT EST AGE 40-64: CPT | Performed by: NURSE PRACTITIONER

## 2018-07-24 RX ORDER — LOSARTAN POTASSIUM 100 MG/1
100 TABLET ORAL DAILY
Qty: 90 TABLET | Refills: 0 | Status: SHIPPED | OUTPATIENT
Start: 2018-07-24 | End: 2019-10-03

## 2018-07-24 NOTE — MR AVS SNAPSHOT
After Visit Summary   7/24/2018    Vera Arellano    MRN: [id number]           Patient Information     Date Of Birth          1960        Visit Information        Provider Department      7/24/2018 10:45 AM Sada Bacon APRN CNP [facility]        Today's Diagnoses     Routine general medical examination at a health care facility    -  1    Essential hypertension with goal blood pressure less than 130/80        Pap smear for cervical cancer screening          Care Instructions      Preventive Health Recommendations  Female Ages 50 - 64    Yearly exam: See your health care provider every year in order to  o Review health changes.   o Discuss preventive care.    o Review your medicines if your doctor has prescribed any.      Get a Pap test every three years (unless you have an abnormal result and your provider advises testing more often).    If you get Pap tests with HPV test, you only need to test every 5 years, unless you have an abnormal result.     You do not need a Pap test if your uterus was removed (hysterectomy) and you have not had cancer.    You should be tested each year for STDs (sexually transmitted diseases) if you're at risk.     Have a mammogram every 1 to 2 years.    Have a colonoscopy at age 50, or have a yearly FIT test (stool test). These exams screen for colon cancer.      Have a cholesterol test every 5 years, or more often if advised.    Have a diabetes test (fasting glucose) every three years. If you are at risk for diabetes, you should have this test more often.     If you are at risk for osteoporosis (brittle bone disease), think about having a bone density scan (DEXA).    Shots: Get a flu shot each year. Get a tetanus shot every 10 years.    Nutrition:     Eat at least 5 servings of fruits and vegetables each day.    Eat whole-grain bread, whole-wheat pasta and brown rice instead of white grains and rice.    Get adequate Calcium and Vitamin D.  "    Lifestyle    Exercise at least 150 minutes a week (30 minutes a day, 5 days a week). This will help you control your weight and prevent disease.    Limit alcohol to one drink per day.    No smoking.     Wear sunscreen to prevent skin cancer.     See your dentist every six months for an exam and cleaning.    See your eye doctor every 1 to 2 years.            Follow-ups after your visit        Follow-up notes from your care team     Return in about 4 weeks (around 2018) for Med check, Lab Work.      Who to contact     If you have questions or need follow up information about today's clinic visit or your schedule please contact [facility] directly at [phone].  Normal or non-critical lab and imaging results will be communicated to you by [employer], letter or phone within 4 business days after the clinic has received the results. If you do not hear from us within 7 days, please contact the clinic through [employer] or phone. If you have a critical or abnormal lab result, we will notify you by phone as soon as possible.  Submit refill requests through [employer] or call your pharmacy and they will forward the refill request to us. Please allow 3 business days for your refill to be completed.          Additional Information About Your Visit        [employer] Information     [employer] lets you send messages to your doctor, view your test results, renew your prescriptions, schedule appointments and more. To sign up, go to [email or web address] . Click on \"Log in\" on the left side of the screen, which will take you to the Welcome page. Then click on \"Sign up Now\" on the right side of the page.     You will be asked to enter the access code listed below, as well as some personal information. Please follow the directions to create your username and password.     Your access code is: [email or web address]  Expires: 10/22/2018 11:45 AM     Your access code will  in 90 days. If you need help or a new code, please call " "your [place] clinic or [phone].        Care EveryWhere ID     This is your Care EveryWhere ID. This could be used by other organizations to access your [place] medical records  [id number]        Your Vitals Were     Pulse Respirations Height Last Period Pulse Oximetry BMI (Body Mass Index)    76 16 1.588 m (5' 2.5\") 08/17/2012 96% 34.56 kg/m2       Blood Pressure from Last 3 Encounters:   07/24/18 124/72   09/25/17 (!) 150/104   06/20/17 122/82    Weight from Last 3 Encounters:   07/24/18 87.1 kg (192 lb)   09/25/17 91.6 kg (202 lb)   06/20/17 91.8 kg (202 lb 6.4 oz)              We Performed the Following     Pap IG rfx HPV ASCU ([facility])          Today's Medication Changes          These changes are accurate as of 7/24/18 11:45 AM.  If you have any questions, ask your nurse or doctor.               Stop taking these medicines if you haven't already. Please contact your care team if you have questions.     hydrochlorothiazide 25 MG tablet   Commonly known as:  HYDRODIURIL   Stopped by:  Sada Bacon APRN CNP                Where to get your medicines      These medications were sent to [facility] [place] - [address]  [address] [facility], [place] [address]     Phone:  [phone]     losartan 100 MG tablet                Primary Care Provider Office Phone # Fax #    ODELL Felix [phone] [address]  [facility] [address]        Equal Access to Services     [facility] : Hadii kamini lopez Soaubrey, waaxda luqadaha, qaybta kaalmadonna montoya. So [facility] [phone].    ATENCIÓN: Si habla español, tiene a barber disposición servicios gratuitos de asistencia lingüística. Delbert mcginnis [phone].    We comply with applicable federal civil rights laws and [place] laws. We do not discriminate on the basis of race, color, national origin, age, disability, sex, sexual orientation, or gender " identity.            Thank you!     Thank you for choosing [facility]  for your care. Our goal is always to provide you with excellent care. Hearing back from our patients is one way we can continue to improve our services. Please take a few minutes to complete the written survey that you may receive in the mail after your visit with us. Thank you!             Your Updated Medication List - Protect others around you: Learn how to safely use, store and throw away your medicines at www.disposemymeds.org.          This list is accurate as of 7/24/18 11:45 AM.  Always use your most recent med list.                   Brand Name Dispense Instructions for use Diagnosis    benzonatate 200 MG capsule    TESSALON    21 capsule    Take 1 capsule (200 mg) by mouth 3 times daily as needed for cough    Cough       losartan 100 MG tablet    COZAAR    90 tablet    Take 1 tablet (100 mg) by mouth daily    Essential hypertension with goal blood pressure less than 130/80       MULTIVITAMIN GUMMIES ADULT PO      Take by mouth daily

## 2018-07-24 NOTE — PROGRESS NOTES
SUBJECTIVE:   CC: Vera Arellano is an 57 year old woman who presents for preventive health visit.  Pt needs labs for HTN, will come back later for a separate visit. Does not want a charge today. One more refill given.     Healthy Habits:    Do you get at least three servings of calcium containing foods daily (dairy, green leafy vegetables, etc.)? yes    Amount of exercise or daily activities, outside of work: 3 day(s) per week, walks at [facility] with dog    Problems taking medications regularly No    Medication side effects: No    Have you had an eye exam in the past two years? no    Do you see a dentist twice per year? yes    Do you have sleep apnea, excessive snoring or daytime drowsiness? no      Mammo 12/5/17:  six month follow right breast mass bilateral diagnotic mammogram and right breast ultrasound due after 1-12-18. Pt does not wish to go there again since nothing definitive found on 3D mammo and US and was given no explanations.          Today's PHQ-2 Score:   PHQ-2 ( 1999 Pfizer) 11/15/2016   Q1: Little interest or pleasure in doing things 0   Q2: Feeling down, depressed or hopeless 0   PHQ-2 Score 0       Abuse: Current or Past(Physical, Sexual or Emotional)- No  Do you feel safe in your environment - Yes    Social History   Substance Use Topics     Smoking status: Never Smoker     Smokeless tobacco: Never Used     Alcohol use 0.0 - 1.2 oz/week     0 - 2 Standard drinks or equivalent per week      Comment: once per month     If you drink alcohol do you typically have >3 drinks per day or >7 drinks per week? No                     Reviewed orders with patient.  Reviewed health maintenance and updated orders accordingly - Yes    Patient over age 50, mutual decision to screen reflected in health maintenance.    Pertinent mammograms are reviewed under the imaging tab.  History of abnormal Pap smear: NO - age 30- 65 PAP every 3 years recommended  PAP / HPV 1/26/2012   PAP NIL     Reviewed and updated as  needed this visit by clinical staff         Reviewed and updated as needed this visit by Provider            ROS:  CONSTITUTIONAL: NEGATIVE for fever, chills, change in weight  INTEGUMENTARY/SKIN: NEGATIVE for worrisome rashes, moles or lesions  EYES: NEGATIVE for vision changes or irritation  ENT: NEGATIVE for ear, mouth and throat problems  RESP: NEGATIVE for significant cough or SOB  BREAST: NEGATIVE for masses, tenderness or discharge  CV: NEGATIVE for chest pain, palpitations or peripheral edema  GI: NEGATIVE for nausea, abdominal pain, heartburn, or change in bowel habits  : NEGATIVE for unusual urinary or vaginal symptoms. No vaginal bleeding.  MUSCULOSKELETAL: NEGATIVE for significant arthralgias or myalgia  NEURO: NEGATIVE for weakness, dizziness or paresthesias  PSYCHIATRIC: NEGATIVE for changes in mood or affect     OBJECTIVE:   LMP 08/17/2012  EXAM:  GENERAL APPEARANCE: healthy, alert and no distress  EYES: Eyes grossly normal to inspection, PERRL and conjunctivae and sclerae normal  HENT: ear canals and TM's normal, nose and mouth without ulcers or lesions, oropharynx clear and oral mucous membranes moist  NECK: no adenopathy, no asymmetry, masses, or scars and thyroid normal to palpation  RESP: lungs clear to auscultation - no rales, rhonchi or wheezes  BREAST: normal without masses, tenderness or nipple discharge and no palpable axillary masses or adenopathy  CV: regular rate and rhythm, normal S1 S2, no S3 or S4, no murmur, click or rub, no peripheral edema and peripheral pulses strong  ABDOMEN: soft, nontender, no hepatosplenomegaly, no masses and bowel sounds normal   (female): normal female external genitalia, normal urethral meatus, vaginal mucosal atrophy noted, normal cervix, adnexae, and uterus without masses or abnormal discharge  MS: no musculoskeletal defects are noted and gait is age appropriate without ataxia  SKIN: no suspicious lesions or rashes  NEURO: Normal strength and tone,  "sensory exam grossly normal, mentation intact and speech normal  PSYCH: mentation appears normal and affect normal/bright    Diagnostic Test Results:  none     ASSESSMENT/PLAN:   1. Routine general medical examination at a health care facility  Healthy female, cont to work on weight loss, exercise, and diet.    2. Essential hypertension with goal blood pressure less than 130/80  Will make appt soon for labs and HTN visit  - losartan (COZAAR) 100 MG tablet; Take 1 tablet (100 mg) by mouth daily  Dispense: 90 tablet; Refill: 0    3. Pap smear for cervical cancer screening  - Pap IG rfx HPV ASCU ([facility])    COUNSELING:   Reviewed preventive health counseling, as reflected in patient instructions       Regular exercise       Healthy diet/nutrition       Vision screening    BP Readings from Last 1 Encounters:   09/25/17 (!) 150/104     Estimated body mass index is 36.36 kg/(m^2) as calculated from the following:    Height as of 9/25/17: 1.588 m (5' 2.5\").    Weight as of 9/25/17: 91.6 kg (202 lb).      Weight management plan: Discussed healthy diet and exercise guidelines and patient will follow up in 12 months in clinic to re-evaluate.     reports that she has never smoked. She has never used smokeless tobacco.      Counseling Resources:  ATP IV Guidelines  Pooled Cohorts Equation Calculator  Breast Cancer Risk Calculator  FRAX Risk Assessment  ICSI Preventive Guidelines  Dietary Guidelines for Americans, 2010  USDA's MyPlate  ASA Prophylaxis  Lung CA Screening    [facility]  "

## 2018-07-24 NOTE — LETTER
[facility]  [address] [place]  [address]  Phone: [phone]    July 27, 2018      Vera Arellano  [address]  [facility] [address]              Dear Vera,    The results from your recent visit showed your pap was normal.        Sincerely,     Sada Bacon CNP    Results for orders placed or performed in visit on 07/24/18   Pap IG rfx HPV ASCU ([facility])   Result Value Ref Range    DIAGNOSIS: Comment     Specimen adequacy: Comment     Clinician Provided ICD10 Comment     Performed by: Comment     . .     Note: Comment     Test Methodology: Comment     . Comment     Narrative    Performed at:  [facility] - [facility]  [address], [place]  [id number]  : Luz Roberto MD, Phone:  [phone]  Specimen Comment: Source.............Cervix  Specimen Comment: LMP / Prev Treat...[id number];None  Specimen Comment: Other..............Post Menopausal  Specimen Comment: No. of containers..01 ThinPrep Vial

## 2018-07-27 LAB
.: NORMAL
.: NORMAL
CLINICIAN PROVIDED ICD10: NORMAL
DIAGNOSIS:: NORMAL
Lab: NORMAL
PERFORMED BY:: NORMAL
SPECIMEN ADEQUACY:: NORMAL
TEST METHODOLOGY:: NORMAL

## 2019-10-03 ENCOUNTER — HOSPITAL ENCOUNTER (EMERGENCY)
Facility: CLINIC | Age: 59
Discharge: HOME | End: 2019-10-03
Attending: EMERGENCY MEDICINE | Admitting: EMERGENCY MEDICINE
Payer: COMMERCIAL

## 2019-10-03 VITALS
RESPIRATION RATE: 14 BRPM | HEIGHT: 63 IN | BODY MASS INDEX: 33.66 KG/M2 | TEMPERATURE: 98.4 F | SYSTOLIC BLOOD PRESSURE: 175 MMHG | HEART RATE: 86 BPM | DIASTOLIC BLOOD PRESSURE: 124 MMHG | WEIGHT: 190 LBS | OXYGEN SATURATION: 98 %

## 2019-10-03 DIAGNOSIS — L30.9 DERMATITIS: ICD-10-CM

## 2019-10-03 PROCEDURE — 99282 EMERGENCY DEPT VISIT SF MDM: CPT

## 2019-10-03 RX ORDER — PREDNISONE 20 MG/1
TABLET ORAL
Qty: 10 TABLET | Refills: 0 | Status: SHIPPED | OUTPATIENT
Start: 2019-10-03 | End: 2019-10-08

## 2019-10-03 RX ORDER — LOSARTAN POTASSIUM 100 MG/1
100 TABLET ORAL DAILY
Qty: 7 TABLET | Refills: 0 | Status: SHIPPED | OUTPATIENT
Start: 2019-10-03 | End: 2019-10-08

## 2019-10-03 ASSESSMENT — ENCOUNTER SYMPTOMS
EYE ITCHING: 1
FACIAL SWELLING: 1
EYE REDNESS: 1

## 2019-10-03 ASSESSMENT — MIFFLIN-ST. JEOR: SCORE: 1415.96

## 2019-10-03 NOTE — ED PROVIDER NOTES
"  History     Chief Complaint:  Facial Swelling    The history is provided by the patient.      Vera Arellano is a 59 year old female who presents with facial swelling as well as pruritus and erythema of her eyebrows and eyes. Patient reports she had her eyebrows and eyelashes tinted two days ago and noticed progressive worsening symptoms since. Vera denies any burning sensations during tinting.  She has had this done before without any problems.  She reports she has been using Benadryl without relief and highlights she has had tinting completed multiple times previously.     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 Vera remarks she is seeing her primary in five days and requested refill for her 100 mg of Losartan in the interim as she has been off of this for some time.     Allergies:  Codeine  Morphine  Percocet  Lisinopril    Medications:    Cozaar    Past Medical History:    Hypertension  Obesity    Past Surgical History:    Right wrist ganglion cyst  Bunionectomy    Family History:    Asthma    Social History:  Never Smoker  Alcohol Use: Positive  Marital Status: Single     Review of Systems   HENT: Positive for facial swelling.    Eyes: Positive for redness and itching.   All other systems reviewed and are negative.    Physical Exam     Patient Vitals for the past 24 hrs:   BP Temp Temp src Pulse Heart Rate Resp SpO2 Height Weight   10/03/19 1201 (!) 175/124 -- -- 86 -- -- -- -- --   10/03/19 1123 (!) 186/104 -- -- -- -- -- -- -- --   10/03/19 1121 (!) 199/115 98.4  F (36.9  C) Oral 104 104 14 98 % 1.6 m (5' 3\") 86.2 kg (190 lb)     Physical Exam  Physical Exam   Constitutional:  Patient is oriented to person, place, and time. They appear well-developed and well-nourished. Mild distress secondary to swelling   HENT:    Patient has moderate facial erythema about both eyes with inflammation at both eyebrows. There is no purulence, pustules, blistering, or fluctuance.   Mouth/Throat: "   Oropharynx is clear and moist.   Eyes:    Mild conjunctival injection. EOM are normal. Pupils are equal, round, and reactive to light.   Neck:    Normal range of motion.   Cardiovascular: Normal rate, regular rhythm and normal heart sounds.  Exam reveals no gallop and no friction rub.  No murmur heard.  Pulmonary/Chest:  Effort normal and breath sounds normal. Patient has no wheezes. Patient has no rales.   Musculoskeletal:  Normal range of motion. Patient exhibits no edema.   Neurological:   Patient is alert and oriented to person, place, and time. Patient has normal strength. No cranial nerve deficit or sensory deficit. GCS 15  Skin:   Skin is warm and dry. No rash noted. No erythema.   Psychiatric:   Patient has a normal mood and affect. Patient's behavior is normal. Judgment and thought content normal.     Emergency Department Course     Emergency Department Course:  Nursing notes and vitals reviewed.  1142 I performed an exam of the patient as documented above. I answered all related questions prior to discharge, anticipatory guidance given.     Impression & Plan      Medical Decision Making:  Vera Arellano is a 59 year old female presenting with contact dermatitis from getting her eyebrows tinted. There is no evidence of periorbital, orbital cellulitis or abscess. At this point, I feel prednisone would be beneficial. She will continue to take Benadryl for the itching. Ice packing the face as well to help with the swelling. She incidentally has run out of her blood pressure and has a physical exam on Tuesday so asked for a bridging refill which I agreed to do after verifying her dose. At this point, the patient will follow-up with her primary care doctor regarding the contact dermatitis. Symptomatic cares were discussed.     Diagnosis:    ICD-10-CM   1. Dermatitis L30.9     Disposition: Home    Discharge Medications:  predniSONE 20 MG tablet  Commonly known as:  DELTASONE      Take two tablets (= 40mg) each  day for 5 (five) days  Quantity:  10 tablet  Refills:  0     losartan 100 MG tablet  Commonly known as:  COZAAR      Dose:  100 mg  Take 1 tablet (100 mg) by mouth daily for 7 days  Quantity:  7 tablet  Refills:  0       Scribe Disclosure:  Mateus RADER, am serving as a scribe at 11:37 AM on 10/3/2019 to document services personally performed by Maria G Santana MD based on my observations and the provider's statements to me.     EMERGENCY DEPARTMENT       Maria G Santana MD  10/07/19 8821

## 2019-10-03 NOTE — ED AVS SNAPSHOT
Emergency Department  [address] [place] [address]  Phone:  [phone]  Fax:  [phone]                                    Vera Arellano   MRN: [id number]    Department:   Emergency Department   Date of Visit:  10/3/2019           After Visit Summary Signature Page    I have received my discharge instructions, and my questions have been answered. I have discussed any challenges I see with this plan with the nurse or doctor.    ..........................................................................................................................................  Patient/Patient Representative Signature      ..........................................................................................................................................  Patient Representative Print Name and Relationship to Patient    ..................................................               ................................................  Date                                   Time    ..........................................................................................................................................  Reviewed by Signature/Title    ...................................................              ..............................................  Date                                               Time          22EPIC Rev 08/18

## 2019-10-03 NOTE — LETTER
October 3, 2019      To Whom It May Concern:      Vera Arellano was seen in our Emergency Department today, 10/03/19.  I expect her condition to improve over the next 2 days.  She may return to work/school when improved.    Sincerely,        Maria G Santana MD

## 2019-10-08 ENCOUNTER — OFFICE VISIT (OUTPATIENT)
Dept: FAMILY MEDICINE | Facility: CLINIC | Age: 59
End: 2019-10-08
Payer: COMMERCIAL

## 2019-10-08 VITALS
HEART RATE: 84 BPM | HEIGHT: 63 IN | TEMPERATURE: 98 F | BODY MASS INDEX: 35.17 KG/M2 | DIASTOLIC BLOOD PRESSURE: 104 MMHG | SYSTOLIC BLOOD PRESSURE: 147 MMHG | OXYGEN SATURATION: 99 % | RESPIRATION RATE: 16 BRPM | WEIGHT: 198.5 LBS

## 2019-10-08 DIAGNOSIS — I10 BENIGN ESSENTIAL HYPERTENSION: Primary | ICD-10-CM

## 2019-10-08 DIAGNOSIS — E78.5 HYPERLIPIDEMIA LDL GOAL <100: ICD-10-CM

## 2019-10-08 LAB
ALBUMIN SERPL-MCNC: 3.6 G/DL (ref 3.4–5)
ALP SERPL-CCNC: 122 U/L (ref 40–150)
ALT SERPL W P-5'-P-CCNC: 63 U/L (ref 0–50)
ANION GAP SERPL CALCULATED.3IONS-SCNC: 6 MMOL/L (ref 3–14)
AST SERPL W P-5'-P-CCNC: 28 U/L (ref 0–45)
BILIRUB SERPL-MCNC: 0.8 MG/DL (ref 0.2–1.3)
BUN SERPL-MCNC: 21 MG/DL (ref 7–30)
CALCIUM SERPL-MCNC: 8.7 MG/DL (ref 8.5–10.1)
CHLORIDE SERPL-SCNC: 105 MMOL/L (ref 94–109)
CHOLEST SERPL-MCNC: 237 MG/DL (ref 0–200)
CHOLEST/HDLC SERPL: 2.9 {RATIO} (ref 0–5)
CO2 SERPL-SCNC: 28 MMOL/L (ref 20–32)
CREAT SERPL-MCNC: 0.83 MG/DL (ref 0.52–1.04)
FASTING SPECIMEN: YES
GFR SERPL CREATININE-BSD FRML MDRD: 78 ML/MIN/{1.73_M2}
GLUCOSE SERPL-MCNC: 85 MG/DL (ref 70–99)
HDLC SERPL-MCNC: 82 MG/DL
LDLC SERPL CALC-MCNC: 120 MG/DL (ref 0–129)
POTASSIUM SERPL-SCNC: 3.9 MMOL/L (ref 3.4–5.3)
PROT SERPL-MCNC: 7.1 G/DL (ref 6.8–8.8)
SODIUM SERPL-SCNC: 138 MMOL/L (ref 133–144)
TRIGL SERPL-MCNC: 177 MG/DL (ref 0–150)
VLDL-CHOLESTEROL: 35 (ref 7–32)

## 2019-10-08 RX ORDER — LOSARTAN POTASSIUM 100 MG/1
100 TABLET ORAL DAILY
Qty: 90 TABLET | Refills: 1 | Status: SHIPPED | OUTPATIENT
Start: 2019-10-08 | End: 2019-11-12

## 2019-10-08 RX ORDER — HYDROCHLOROTHIAZIDE 12.5 MG/1
12.5 TABLET ORAL DAILY
Qty: 30 TABLET | Refills: 1 | Status: SHIPPED | OUTPATIENT
Start: 2019-10-08 | End: 2019-11-12

## 2019-10-08 ASSESSMENT — MIFFLIN-ST. JEOR: SCORE: 1454.39

## 2019-10-08 NOTE — PATIENT INSTRUCTIONS
Use zyrtec or claritin once daily for the next week.  Can take additional dose of benadryl at night.    Welcome to [facility], where we are committed to the art of inspired primary care.  Thank you for choosing us to be a part of your well-being.    The clinic is open Monday through Friday, 8:00 a.m. - 5:00 p.m., and Saturdays from 8:00 a.m. - 12:00 p.m.  After-hours questions are directed to our 24-hour nurse line, which can be reached by calling the clinic at [phone].  You can also contact the clinic through Reppler, our online patient portal.  (Please allow 1-2 business days for a response via Reppler.)  If you are not already enrolled in Reppler, access instructions are below.    If you need a refill on your prescription, please contact the pharmacy where you filled it, and they will contact our clinic with the details of what is needed.  If your prescription is a controlled substance, you will have a conversation with your provider to determine if you would like to  your prescription at the clinic or have it mailed to your pharmacy.  Please allow 2-3 business days for all refill requests to be handled.    We look forward to providing you with great care!  Please let me know if you have any questions.  Thank you for allowing me to participate in your care.  It was my pleasure meeting you.  Ashley Rose PA-C      Patient Education     Eating Heart-Healthy Food: Using the DASH Plan    Eating for your heart doesn t have to be hard or boring. You just need to know how to make healthier choices. The DASH eating plan has been developed to help you do just that. DASH stands for Dietary Approaches to Stop Hypertension. It is a plan that has been proven to be healthier for your heart and to lower your risk for high blood pressure. It can also help lower your risk for cancer, heart disease, osteoporosis, and diabetes.  Choosing from each food group  Choose foods from each  of the food groups below each day. Try to get the recommended number of servings for each food group. The serving numbers are based on a diet of 2,000 calories a day. Talk to your doctor if you re unsure about your calorie needs. Along with getting the correct servings, the DASH plan also recommends a sodium intake less than 2,300 mg per day.        Grains  Servings: 6 to 8 a day  A serving is:    1 slice bread    1 ounce dry cereal    Half a cup cooked rice, pasta or cereal  Best choices: Whole grains and any grains high in fiber. Vegetables  Servings: 4 to 5 a day  A serving is:    1 cup raw leafy vegetable    Half a cup cut-up raw or cooked vegetable    Half a cup vegetable juice  Best choices: Fresh or frozen vegetables prepared without added salt or fat.   Fruits  Servings: 4 to 5 a day  A serving is:    1 medium fruit    One-quarter cup dried fruit    Half a cup fresh, frozen, or canned fruit    Half a cup of 100% fruit juices  Best choices: A variety of fresh fruits of different colors. Whole fruits are a better choice than fruit juices. Low-fat or fat-free dairy  Servings: 2 to 3 a day  A serving is:    1 cup milk    1 cup yogurt    One and a half ounces cheese  Best choices: Skim or 1% milk, low-fat or fat-free yogurt or buttermilk, and low-fat cheeses.         Lean meats, poultry, fish  Servings: 6 or fewer a day  A serving is:    1 ounce cooked meats, poultry, or fish    1 egg  Best choices: Lean poultry and fish. Trim away visible fat. Broil, grill, roast, or boil instead of frying. Remove skin from poultry before eating. Limit how much red meat you eat.  Nuts, seeds, beans  Servings: 4 to 5 a week  A serving is:    One-third cup nuts (one and a half ounces)    2 tablespoons nut butter or seeds    Half a cup cooked dry beans or legumes  Best choices: Dry roasted nuts with no salt added, lentils, kidney beans, garbanzo beans, and whole schumacher beans.   Fats and oils  Servings: 2 to 3 a day  A serving  is:    1 teaspoon vegetable oil    1 teaspoon soft margarine    1 tablespoon mayonnaise    2 tablespoons salad dressing  Best choices: Nut and vegetable oils (nontropical vegetable oils), such as olive and canola oil. Sweets  Servings: 5 a week or fewer  A serving is:    1 tablespoon sugar, maple syrup, or honey    1 tablespoon jam or jelly    1 half-ounce jelly beans (about 15)    1 cup lemonade  Best choices: Dried fruit can be a satisfying sweet. Choose low-fat sweets. And watch your serving sizes!      For more on the DASH eating plan, visit:  www.nhlbi.nih.gov/health/health-topics/topics/dash   Date Last Reviewed: 6/1/2016 2000-2018 [employer]. [address] [place] [address], [place] [address]. All rights reserved. This information is not intended as a substitute for professional medical care. Always follow your healthcare professional's instructions.

## 2019-10-08 NOTE — NURSING NOTE
"59 year old  Chief Complaint   Patient presents with     Recheck Medication     losartan 100mg        Blood pressure (!) 168/105, pulse 84, temperature 98  F (36.7  C), temperature source Oral, resp. rate 16, height 1.6 m (5' 2.99\"), weight 90 kg (198 lb 8 oz), last menstrual period 08/17/2012, SpO2 99 %, not currently breastfeeding. Body mass index is 35.17 kg/m .  Patient Active Problem List   Diagnosis     Obesity     Essential hypertension with goal blood pressure less than 130/80       Wt Readings from Last 2 Encounters:   10/08/19 90 kg (198 lb 8 oz)   10/03/19 86.2 kg (190 lb)     BP Readings from Last 3 Encounters:   10/08/19 (!) 168/105   10/03/19 (!) 175/124   07/24/18 124/72         Current Outpatient Medications   Medication     losartan (COZAAR) 100 MG tablet     Multiple Vitamins-Minerals (MULTIVITAMIN GUMMIES ADULT PO)     benzonatate (TESSALON) 200 MG capsule     predniSONE (DELTASONE) 20 MG tablet     No current facility-administered medications for this visit.        Social History     Tobacco Use     Smoking status: Never Smoker     Smokeless tobacco: Never Used   Substance Use Topics     Alcohol use: Yes     Alcohol/week: 0.0 - 2.0 standard drinks     Comment: once per month     Drug use: No       Health Maintenance Due   Topic Date Due     HIV SCREENING  08/17/1977     HPV  08/17/1983     ZOSTER IMMUNIZATION (1 of 2) 08/17/2012     PHQ-2  01/01/2019     MAMMO SCREENING  01/19/2019     PREVENTIVE CARE VISIT  07/24/2019     PAP  07/24/2019     INFLUENZA VACCINE (1) 09/01/2019       Lab Results   Component Value Date    PAP NIL 01/26/2012 October 8, 2019 9:28 AM  "

## 2019-10-08 NOTE — PROGRESS NOTES
Subjective     Vera Arellano is a 59 year old female who presents to clinic today for the following health issues:    HPI   New Patient/Transfer of Care:  Vera had been receiving care at [facility], but notes her physician retired.  Her niece Kaylah Stanford recommend she visit [facility] [place].    Hypertension Follow-up: Vera has a hx of hypertension over the past 2 years.  She was on both losartan 100 mg and hydrochlorothiazide at one point, but notes she began to lose weight and exercise more often, which caused her BP to drop low.  She discontinued her hydrochlorothiazide first, and eventually discontinued her losartan as well, on her own.  She notes she has been off medication for the past year, and believes her BP has been elevated again recently due to decreased exercise and weight gain.  She was given losartan 100mg during an ER visit on 10/03/2019.  Of note, she had been on lisinopril in the past, but developed a hacking cough while taking this.    Patient denies symptoms associated with high blood pressure including blurred vision, headache, chest pain, heart palpitations, shortness of breath and pedal edema.    Do you check your blood pressure regularly outside of the clinic? Yes     Are you following a low salt diet? No    Are your blood pressures ever more than 140 on the top number (systolic) OR more   than 90 on the bottom number (diastolic), for example 140/90? Yes     BP Readings from Last 3 Encounters:   10/08/19 (!) 147/104   10/03/19 (!) 175/124   07/24/18 124/72     Emergency Visit Follow up: 10/3/2019: Presented with facial swelling as well as pruritus and erythema of her eyebrows and eyes. Patient reports she had her eyebrows and eyelashes tinted two prior and noticed progressive worsening symptoms since. Vera denies any burning sensations during tinting.  She has had this done before without any problems.  She reports she has been using Benadryl without relief and highlights she has had  tinting completed multiple times previously.   ALLERGIC REACTION:  She reports she is doing better but needs to get back on blood pressure medication. She continues to have facial swelling but it is much better.     Onset: 10/03/2019    Description: Vera visited ED on 10/03/2019, after she had an allergic reaction after getting her eyelashes and eyebrows done.  She reports her eyes were swollen completely shut.  She was given a round of prednisone, and notes this has helped improve sx.  She still has red eyes, and does not believe she has fully recovered.  She has been taking Benadryl, 50 mg at 8 pm and midnight.  She is unsure exactly what caused this allergic reaction.  Nasal congestion: no  Sneezing: no  Red, itchy eyes: YES- eyes were swollen closed    Progression of Symptoms:  better    Accompanying Signs & Symptoms:  Cough: no  Wheezing: no  Rash: no  Sinus/facial pain: YES- swollen face   History:   Is it seasonal: none   History of Asthma: no  Has allergy testing been done: no    Precipitating factors:   Occurred after getting her nails and eyelashes completed    Alleviating factors:  None       Therapies Tried and outcome: Prednisone and Benadryl       Patient Active Problem List   Diagnosis     Obesity     Essential hypertension with goal blood pressure less than 130/80     Past Surgical History:   Procedure Laterality Date     ------------OTHER-------------      right wrist ganglion cyst     BUNIONECTOMY      in  her 20s       Social History     Tobacco Use     Smoking status: Never Smoker     Smokeless tobacco: Never Used   Substance Use Topics     Alcohol use: Yes     Alcohol/week: 0.0 - 2.0 standard drinks     Comment: once per month     Family History   Problem Relation Age of Onset     Asthma Mother         house fire history long before that     Other - See Comments Sister         suicide     Chronic Obstructive Pulmonary Disease Brother      Chronic Obstructive Pulmonary Disease Brother       "    Current Outpatient Medications   Medication Sig Dispense Refill     hydrochlorothiazide (HYDRODIURIL) 12.5 MG tablet Take 1 tablet (12.5 mg) by mouth daily 30 tablet 1     losartan (COZAAR) 100 MG tablet Take 1 tablet (100 mg) by mouth daily 90 tablet 1     Multiple Vitamins-Minerals (MULTIVITAMIN GUMMIES ADULT PO) Take by mouth daily       benzonatate (TESSALON) 200 MG capsule Take 1 capsule (200 mg) by mouth 3 times daily as needed for cough (Patient not taking: Reported on 7/24/2018) 21 capsule 0     predniSONE (DELTASONE) 20 MG tablet Take two tablets (= 40mg) each day for 5 (five) days (Patient not taking: Reported on 10/8/2019) 10 tablet 0     Allergies   Allergen Reactions     Codeine Nausea and Vomiting     Morphine Nausea and Vomiting     Percocet [Oxycodone-Acetaminophen] Nausea and Vomiting     Lisinopril Cough       Reviewed and updated as needed this visit by Provider  Tobacco  Allergies  Meds  Problems  Med Hx  Surg Hx  Fam Hx         Review of Systems   ROS COMP: Constitutional, HEENT, cardiovascular, pulmonary, gi and gu systems are negative, except as otherwise noted.      Objective    BP (!) 147/104   Pulse 84   Temp 98  F (36.7  C) (Oral)   Resp 16   Ht 1.6 m (5' 2.99\")   Wt 90 kg (198 lb 8 oz)   LMP 08/17/2012   SpO2 99%   BMI 35.17 kg/m    Body mass index is 35.17 kg/m .  Physical Exam    GENERAL: healthy, alert and no distress  EYES: Erythema of conjunctiva and eyelids.  Minimal inflammation.    NECK: no adenopathy, no asymmetry, masses, or scars and thyroid normal to palpation. No bruits.  RESP: lungs clear to auscultation - no rales, rhonchi or wheezes  CV: regular rate and rhythm, normal S1 S2, no S3 or S4, no murmur, click or rub, no peripheral edema and peripheral pulses strong  MS: no gross musculoskeletal defects noted, no edema.  no clubbing, edema or cyanosis of extremities. Pulses = and appropriate bilaterally to DP and PT  SKIN: face generally erythematous with " "minimal inflammation.  No tenderness.  Psych:  Thoughts linear.  Cooperative.  Well dressed and groomed. Affect-anxious with slight forced speech.      Assessment & Plan       ICD-10-CM    1. Benign essential hypertension I10 hydrochlorothiazide (HYDRODIURIL) 12.5 MG tablet     losartan (COZAAR) 100 MG tablet     Comprehensive metabolic panel     Lipid Panel ([place])   2. Hyperlipidemia LDL goal <100 E78.5 Comprehensive metabolic panel     Lipid Panel ([place])     Add hydrochlorothiazide to losartan. Labs as noted. DASH diet.  See patient instructions.  Info in how to take BP given.  Schedule a physical in the next month or so.  We can adjust medication then as needed.  Check BP at home, sign up for mychart and send in results.     BMI:   Estimated body mass index is 33.66 kg/m  as calculated from the following:    Height as of 10/3/19: 1.6 m (5' 3\").    Weight as of 10/3/19: 86.2 kg (190 lb).       Return in about 4 weeks (around 11/5/2019) for Physical Exam and HTN recheck.    Saba Rose PA-C  [facility]    I, Rajesh Naik, am serving as a scribe to document services personally performed by Saba Rose PA-C, based on data collection and the provider's statements to me. Saba Rose PA-C, has reviewed, edited, and approv      "

## 2019-10-08 NOTE — LETTER
October 9, 2019      Vera Arellano  [address]  [place] [address]              Hi Vera,    It was a pleasure meeting you yesterday.  We appreciate you choosing the [facility] for your care.    Your labs are copied below and I have a few comments.    Glucose and kidney function test and are normal.    One of the liver function tests is slightly elevated.  This could just be a fluke but we should repeat this in one month.  We can do this at your next appointment that we talked about.    Your lipids are OK. Based on these numbers your cardiovascular risk score--the percent chance of developing a cardiac event over the next 10 years--is relatively low.  This number helps us to determine if we should consider using a cholesterol lowering medication.  We start to consider this when the % chance is above 7.5%.  Though I am not recommending a cholesterol lowering medication I am encouraging life style management to decrease cardiac risk.  This includes regular exercise, weight management and a heart healthy diet with plenty of fruits and vegetables, limited amounts of good fats--monounsaturated as well as limiting red and processed meats.  You can find more information about this on the American Heart Association website.  I am happy to send you more information or have you speak to a dietician if you are interested.  We should check your lipid profile every year to monitor.    The 10-year ASCVD risk score (Mary DC Jr., et al., 2013) is: 3.6%*    Values used to calculate the score:      Age: 59 years      Sex: Female      Is Non- : No      Diabetic: No      Tobacco smoker: No      Systolic Blood Pressure: 147 mmHg      Is BP treated: Yes      HDL Cholesterol: 82 mg/dL*      Total Cholesterol: 237 mg/dL*      * - Cholesterol units were assumed for this score calculation             Sincerely,          Saba Rose PA-C    Results for orders placed or performed in visit on  10/08/19   Comprehensive metabolic panel   Result Value Ref Range    Sodium 138 133 - 144 mmol/L    Potassium 3.9 3.4 - 5.3 mmol/L    Chloride 105 94 - 109 mmol/L    Carbon Dioxide 28 20 - 32 mmol/L    Anion Gap 6 3 - 14 mmol/L    Glucose 85 70 - 99 mg/dL    Urea Nitrogen 21 7 - 30 mg/dL    Creatinine 0.83 0.52 - 1.04 mg/dL    GFR Estimate 78 >60 mL/min/[1.73_m2]    GFR Estimate If Black >90 >60 mL/min/[1.73_m2]    Calcium 8.7 8.5 - 10.1 mg/dL    Bilirubin Total 0.8 0.2 - 1.3 mg/dL    Albumin 3.6 3.4 - 5.0 g/dL    Protein Total 7.1 6.8 - 8.8 g/dL    Alkaline Phosphatase 122 40 - 150 U/L    ALT 63 (H) 0 - 50 U/L    AST 28 0 - 45 U/L   Lipid Panel ([place])   Result Value Ref Range    FASTING SPECIMEN yes     Cholesterol 237.0 (H) 0.0 - 200.0    HDL Cholesterol 82.0 >50.0    Triglycerides 177.0 (H) 0.0 - 150.0    Cholesterol/HDL Ratio 2.9 0.0 - 5.0    LDL Cholesterol Direct 120.0 0.0 - 129.0    VLDL-Cholesterol 35.0 (H) 7.0 - 32.0

## 2019-10-22 ENCOUNTER — OFFICE VISIT (OUTPATIENT)
Dept: OPTOMETRY | Facility: CLINIC | Age: 59
End: 2019-10-22
Payer: COMMERCIAL

## 2019-10-22 DIAGNOSIS — H10.533: Primary | ICD-10-CM

## 2019-10-22 PROCEDURE — 92002 INTRM OPH EXAM NEW PATIENT: CPT | Performed by: OPTOMETRIST

## 2019-10-22 ASSESSMENT — VISUAL ACUITY
OD_PH_SC: 20/25
METHOD: SNELLEN - LINEAR
OS_SC+: -2
OS_SC: 20/20
OD_PH_SC+: -2
OD_SC: 20/40

## 2019-10-22 ASSESSMENT — TONOMETRY
OS_IOP_MMHG: 10
IOP_METHOD: APPLANATION
OD_IOP_MMHG: 13

## 2019-10-22 ASSESSMENT — EXTERNAL EXAM - LEFT EYE: OS_EXAM: ROSACEA

## 2019-10-22 ASSESSMENT — EXTERNAL EXAM - RIGHT EYE: OD_EXAM: ROSACEA

## 2019-10-22 NOTE — LETTER
10/22/2019         RE: Vera Arellano  [address]  [place] [address]        Dear Colleague,    Thank you for referring your patient, [facility]llano[facility]. Please see a copy of my visit note below.    Chief Complaint   Patient presents with     Eye Swelling     X 2 weeks still red and itchy and burning  Do you wear contact lenses? No    POHX: LASIK OD    Inga Cespedes CPO    See Review Of Systems       Medical, surgical and family histories reviewed and updated 10/22/2019.       Used prednisone 20 mg x 4 days , benadryl at bedtime since  thera tears as needed usually at least four times daily   No cold compresses made skin itchy  vaseline    OBJECTIVE: See Ophthalmology exam    ASSESSMENT:  No diagnosis found.   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        Sincerely,        Sachi Fowler, OD

## 2019-10-22 NOTE — PATIENT INSTRUCTIONS
Use preservative free artificial tears  Every 2 h on left eye for the next 2 days then as needed   As needed in R eye  Rule of thumb is 4 uses of bottled product per day   Use ointment up to one week and discontinue       After resolution treat dry eye with below treatment   Meibomian gland dysfunction or Posterior Blepharitis, is characterized by inflammation along both the uppper and lower eyelid margins. A single row of these glands is present in each lid with openings along the lid margins.  It is often found in association with skin conditions such as rosacea and seborrheic dermatitis.    Symptoms include:  ?Red eyes  ?Gritty or burning sensation  ?Excessive tearing  ?Itchy eyelids  ?Red, swollen eyelids  ?Crusting or matting of eyelashes in the morning  ?Light sensitivity  ?Blurred vision    It is important to keep cosmetics from blocking these oil glands. If blocked, they do not   excrete oil into the tear film, which causes the tears to evaporate quickly.   This may result in watery eyes.  There is also an increase of bacterial growth when the tear film is unstable, leading to further ocular surface inflammation.    Warm compresses are very beneficial to the normal functioning of the eye.  Warm compresses for 5-10 minutes twice daily and keeping the lid margins clean  and help maintain a good tear film.   Moisten a washcloth with hot water, or microwave for 10 seconds, being careful to not get the cloth too hot.   Then put the washcloth onto your eyelids for 5 minutes. It will cool quickly so a rice pack or eyemask that can be heated and laid on top of the washcloth will help retain the heat.    Omega 3 fatty acid supplements taken once to twice daily and artificial tears such as Soothe xp, Refresh optive , Retaine and systane balance are also an additional treatment to control inflammation and help soothe your eyes.

## 2019-10-22 NOTE — PROGRESS NOTES
Chief Complaint   Patient presents with     Eye Swelling     X 2 weeks still red and itchy and burning  Do you wear contact lenses? No    POHX: LASIK OD    Inga Cespedes CPO    See Review Of Systems       Medical, surgical and family histories reviewed and updated 10/22/2019.       Used prednisone 20 mg x 4 days , benadryl at bedtime since  thera tears as needed usually at least four times daily   No cold compresses made skin itchy  vaseline    OBJECTIVE: See Ophthalmology exam    ASSESSMENT:  No diagnosis found.   

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
## 2019-11-12 ENCOUNTER — OFFICE VISIT (OUTPATIENT)
Dept: FAMILY MEDICINE | Facility: CLINIC | Age: 59
End: 2019-11-12
Payer: COMMERCIAL

## 2019-11-12 VITALS
BODY MASS INDEX: 34.2 KG/M2 | OXYGEN SATURATION: 99 % | HEART RATE: 76 BPM | TEMPERATURE: 97.7 F | SYSTOLIC BLOOD PRESSURE: 136 MMHG | HEIGHT: 63 IN | DIASTOLIC BLOOD PRESSURE: 88 MMHG | WEIGHT: 193 LBS

## 2019-11-12 DIAGNOSIS — Z12.31 VISIT FOR SCREENING MAMMOGRAM: ICD-10-CM

## 2019-11-12 DIAGNOSIS — Z23 FLU VACCINE NEED: ICD-10-CM

## 2019-11-12 DIAGNOSIS — Z12.4 SCREENING FOR CERVICAL CANCER: ICD-10-CM

## 2019-11-12 DIAGNOSIS — Z00.00 ROUTINE GENERAL MEDICAL EXAMINATION AT A HEALTH CARE FACILITY: Primary | ICD-10-CM

## 2019-11-12 DIAGNOSIS — I10 BENIGN ESSENTIAL HYPERTENSION: ICD-10-CM

## 2019-11-12 DIAGNOSIS — R79.89 ELEVATED LFTS: ICD-10-CM

## 2019-11-12 DIAGNOSIS — Z13.220 SCREENING FOR LIPID DISORDERS: ICD-10-CM

## 2019-11-12 DIAGNOSIS — I10 ESSENTIAL HYPERTENSION WITH GOAL BLOOD PRESSURE LESS THAN 130/80: ICD-10-CM

## 2019-11-12 LAB
ALT SERPL W P-5'-P-CCNC: 75 U/L (ref 0–50)
BUN SERPL-MCNC: 10 MG/DL (ref 7–30)
CALCIUM SERPL-MCNC: 9.6 MG/DL (ref 8.5–10.4)
CHLORIDE SERPLBLD-SCNC: 102 MMOL/L (ref 94–109)
CO2 SERPL-SCNC: 30 MMOL/L (ref 20–32)
CREAT SERPL-MCNC: 0.8 MG/DL (ref 0.6–1.3)
EGFR CALCULATED (BLACK REFERENCE): 95.1
EGFR CALCULATED (NON BLACK REFERENCE): 78.6
GLUCOSE SERPL-MCNC: 100 MG/DL (ref 60–99)
POTASSIUM SERPL-SCNC: 3.6 MMOL/L (ref 3.4–5.3)
SODIUM SERPL-SCNC: 141 MMOL/L (ref 137.3–146.3)

## 2019-11-12 RX ORDER — HYDROCHLOROTHIAZIDE 12.5 MG/1
12.5 TABLET ORAL DAILY
Qty: 90 TABLET | Refills: 3 | Status: SHIPPED | OUTPATIENT
Start: 2019-11-12 | End: 2021-11-18

## 2019-11-12 RX ORDER — LOSARTAN POTASSIUM 100 MG/1
100 TABLET ORAL DAILY
Qty: 90 TABLET | Refills: 3 | Status: SHIPPED | OUTPATIENT
Start: 2019-11-12 | End: 2021-08-05

## 2019-11-12 SDOH — HEALTH STABILITY: MENTAL HEALTH: HOW OFTEN DO YOU HAVE A DRINK CONTAINING ALCOHOL?: 2-4 TIMES A MONTH

## 2019-11-12 SDOH — ECONOMIC STABILITY: TRANSPORTATION INSECURITY
IN THE PAST 12 MONTHS, HAS THE LACK OF TRANSPORTATION KEPT YOU FROM MEDICAL APPOINTMENTS OR FROM GETTING MEDICATIONS?: NO

## 2019-11-12 SDOH — HEALTH STABILITY: PHYSICAL HEALTH: ON AVERAGE, HOW MANY MINUTES DO YOU ENGAGE IN EXERCISE AT THIS LEVEL?: 60 MIN

## 2019-11-12 SDOH — HEALTH STABILITY: PHYSICAL HEALTH: ON AVERAGE, HOW MANY DAYS PER WEEK DO YOU ENGAGE IN MODERATE TO STRENUOUS EXERCISE (LIKE A BRISK WALK)?: 7 DAYS

## 2019-11-12 SDOH — ECONOMIC STABILITY: TRANSPORTATION INSECURITY
IN THE PAST 12 MONTHS, HAS LACK OF TRANSPORTATION KEPT YOU FROM MEETINGS, WORK, OR FROM GETTING THINGS NEEDED FOR DAILY LIVING?: NO

## 2019-11-12 SDOH — ECONOMIC STABILITY: FOOD INSECURITY: WITHIN THE PAST 12 MONTHS, YOU WORRIED THAT YOUR FOOD WOULD RUN OUT BEFORE YOU GOT MONEY TO BUY MORE.: NEVER TRUE

## 2019-11-12 SDOH — HEALTH STABILITY: MENTAL HEALTH: HOW OFTEN DO YOU HAVE 6 OR MORE DRINKS ON ONE OCCASION?: NEVER

## 2019-11-12 SDOH — HEALTH STABILITY: MENTAL HEALTH: HOW MANY STANDARD DRINKS CONTAINING ALCOHOL DO YOU HAVE ON A TYPICAL DAY?: 1 OR 2

## 2019-11-12 SDOH — HEALTH STABILITY: MENTAL HEALTH
STRESS IS WHEN SOMEONE FEELS TENSE, NERVOUS, ANXIOUS, OR CAN'T SLEEP AT NIGHT BECAUSE THEIR MIND IS TROUBLED. HOW STRESSED ARE YOU?: ONLY A LITTLE

## 2019-11-12 SDOH — ECONOMIC STABILITY: INCOME INSECURITY: HOW HARD IS IT FOR YOU TO PAY FOR THE VERY BASICS LIKE FOOD, HOUSING, MEDICAL CARE, AND HEATING?: NOT HARD AT ALL

## 2019-11-12 SDOH — ECONOMIC STABILITY: FOOD INSECURITY: WITHIN THE PAST 12 MONTHS, THE FOOD YOU BOUGHT JUST DIDN'T LAST AND YOU DIDN'T HAVE MONEY TO GET MORE.: NEVER TRUE

## 2019-11-12 ASSESSMENT — MIFFLIN-ST. JEOR: SCORE: 1426.31

## 2019-11-12 NOTE — NURSING NOTE
"59 year old  Chief Complaint   Patient presents with     Physical     check in for blood pressure and pt also reports she needs a lab redraw for LFT's        Blood pressure (!) 142/86, pulse 76, temperature 97.7  F (36.5  C), temperature source Oral, height 1.595 m (5' 2.8\"), weight 87.5 kg (193 lb), last menstrual period 08/17/2012, SpO2 99 %, not currently breastfeeding. Body mass index is 34.41 kg/m .  Patient Active Problem List   Diagnosis     BMI 35     Essential hypertension with goal blood pressure less than 130/80       Wt Readings from Last 2 Encounters:   11/12/19 87.5 kg (193 lb)   10/08/19 90 kg (198 lb 8 oz)     BP Readings from Last 3 Encounters:   11/12/19 (!) 142/86   10/08/19 (!) 147/104   10/03/19 (!) 175/124         Current Outpatient Medications   Medication     hydrochlorothiazide (HYDRODIURIL) 12.5 MG tablet     losartan (COZAAR) 100 MG tablet     Multiple Vitamins-Minerals (MULTIVITAMIN GUMMIES ADULT PO)     No current facility-administered medications for this visit.        Social History     Tobacco Use     Smoking status: Never Smoker     Smokeless tobacco: Never Used   Substance Use Topics     Alcohol use: Yes     Alcohol/week: 0.0 - 2.0 standard drinks     Comment: once per month     Drug use: No       Health Maintenance Due   Topic Date Due     HIV SCREENING  08/17/1977     HPV  08/17/1983     ZOSTER IMMUNIZATION (1 of 2) 08/17/2012     MAMMO SCREENING  01/19/2019     PAP  07/24/2019       Lab Results   Component Value Date    PAP NIL 01/26/2012 November 12, 2019 10:48 AM  "

## 2019-11-18 DIAGNOSIS — R79.89 ELEVATED LFTS: Primary | ICD-10-CM

## 2019-12-09 ENCOUNTER — OFFICE VISIT (OUTPATIENT)
Dept: FAMILY MEDICINE | Facility: CLINIC | Age: 59
End: 2019-12-09
Payer: COMMERCIAL

## 2019-12-09 VITALS
SYSTOLIC BLOOD PRESSURE: 130 MMHG | BODY MASS INDEX: 34.33 KG/M2 | HEART RATE: 81 BPM | TEMPERATURE: 98.1 F | WEIGHT: 193.75 LBS | HEIGHT: 63 IN | OXYGEN SATURATION: 96 % | DIASTOLIC BLOOD PRESSURE: 92 MMHG | RESPIRATION RATE: 16 BRPM

## 2019-12-09 DIAGNOSIS — R05.3 PERSISTENT COUGH: Primary | ICD-10-CM

## 2019-12-09 RX ORDER — PREDNISONE 20 MG/1
40 TABLET ORAL DAILY
Qty: 10 TABLET | Refills: 0 | Status: SHIPPED | OUTPATIENT
Start: 2019-12-09 | End: 2021-11-18

## 2019-12-09 ASSESSMENT — MIFFLIN-ST. JEOR: SCORE: 1434.09

## 2019-12-09 NOTE — NURSING NOTE
"59 year old  Chief Complaint   Patient presents with     Cough     dry cough x 4 weeks, not getting better, also thinks pulled a muscle from coughing       Blood pressure (!) 130/92, pulse 81, temperature 98.1  F (36.7  C), temperature source Oral, resp. rate 16, height 1.602 m (5' 3.07\"), weight 87.9 kg (193 lb 12 oz), last menstrual period 08/17/2012, SpO2 96 %, not currently breastfeeding. Body mass index is 34.24 kg/m .  Patient Active Problem List   Diagnosis     BMI 35     Essential hypertension with goal blood pressure less than 130/80       Wt Readings from Last 2 Encounters:   12/09/19 87.9 kg (193 lb 12 oz)   11/12/19 87.5 kg (193 lb)     BP Readings from Last 3 Encounters:   12/09/19 (!) 130/92   11/12/19 136/88   10/08/19 (!) 147/104         Current Outpatient Medications   Medication     hydrochlorothiazide (HYDRODIURIL) 12.5 MG tablet     losartan (COZAAR) 100 MG tablet     Multiple Vitamins-Minerals (MULTIVITAMIN GUMMIES ADULT PO)     No current facility-administered medications for this visit.        Social History     Tobacco Use     Smoking status: Never Smoker     Smokeless tobacco: Never Used   Substance Use Topics     Alcohol use: Yes     Alcohol/week: 0.0 - 2.0 standard drinks     Frequency: 2-4 times a month     Drinks per session: 1 or 2     Binge frequency: Never     Comment: once per month     Drug use: No       Health Maintenance Due   Topic Date Due     HPV  08/17/1983     ZOSTER IMMUNIZATION (1 of 2) 08/17/2012     MAMMO SCREENING  01/19/2019       Lab Results   Component Value Date    PAP NIL 01/26/2012 December 9, 2019 4:49 PM    "
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
## 2019-12-16 ENCOUNTER — ANCILLARY PROCEDURE (OUTPATIENT)
Dept: MAMMOGRAPHY | Facility: CLINIC | Age: 59
End: 2019-12-16
Payer: COMMERCIAL

## 2019-12-16 DIAGNOSIS — Z12.31 VISIT FOR SCREENING MAMMOGRAM: ICD-10-CM

## 2019-12-16 PROCEDURE — 77063 BREAST TOMOSYNTHESIS BI: CPT | Mod: TC

## 2019-12-16 PROCEDURE — 77067 SCR MAMMO BI INCL CAD: CPT | Mod: TC

## 2020-12-14 ENCOUNTER — HEALTH MAINTENANCE LETTER (OUTPATIENT)
Age: 60
End: 2020-12-14

## 2021-08-05 ENCOUNTER — APPOINTMENT (OUTPATIENT)
Dept: GENERAL RADIOLOGY | Facility: CLINIC | Age: 61
End: 2021-08-05
Attending: EMERGENCY MEDICINE
Payer: COMMERCIAL

## 2021-08-05 ENCOUNTER — MYC MEDICAL ADVICE (OUTPATIENT)
Dept: FAMILY MEDICINE | Facility: CLINIC | Age: 61
End: 2021-08-05

## 2021-08-05 ENCOUNTER — APPOINTMENT (OUTPATIENT)
Dept: ULTRASOUND IMAGING | Facility: CLINIC | Age: 61
End: 2021-08-05
Attending: EMERGENCY MEDICINE
Payer: COMMERCIAL

## 2021-08-05 ENCOUNTER — HOSPITAL ENCOUNTER (EMERGENCY)
Facility: CLINIC | Age: 61
Discharge: HOME | End: 2021-08-05
Attending: EMERGENCY MEDICINE | Admitting: EMERGENCY MEDICINE
Payer: COMMERCIAL

## 2021-08-05 VITALS
RESPIRATION RATE: 18 BRPM | OXYGEN SATURATION: 99 % | DIASTOLIC BLOOD PRESSURE: 125 MMHG | TEMPERATURE: 98.7 F | SYSTOLIC BLOOD PRESSURE: 193 MMHG | HEART RATE: 86 BPM

## 2021-08-05 DIAGNOSIS — L03.116 CELLULITIS OF LEFT LOWER EXTREMITY: ICD-10-CM

## 2021-08-05 DIAGNOSIS — I10 BENIGN ESSENTIAL HYPERTENSION: ICD-10-CM

## 2021-08-05 PROCEDURE — 99284 EMERGENCY DEPT VISIT MOD MDM: CPT | Mod: 25

## 2021-08-05 PROCEDURE — 73630 X-RAY EXAM OF FOOT: CPT | Mod: LT

## 2021-08-05 PROCEDURE — 93971 EXTREMITY STUDY: CPT | Mod: LT

## 2021-08-05 RX ORDER — LOSARTAN POTASSIUM 100 MG/1
100 TABLET ORAL DAILY
Qty: 90 TABLET | Refills: 0 | Status: SHIPPED | OUTPATIENT
Start: 2021-08-05 | End: 2021-08-05

## 2021-08-05 RX ORDER — CEPHALEXIN 500 MG/1
500 CAPSULE ORAL 4 TIMES DAILY
Qty: 40 CAPSULE | Refills: 0 | Status: SHIPPED | OUTPATIENT
Start: 2021-08-05 | End: 2021-08-15

## 2021-08-05 RX ORDER — LOSARTAN POTASSIUM 100 MG/1
100 TABLET ORAL DAILY
Qty: 30 TABLET | Refills: 0 | Status: SHIPPED | OUTPATIENT
Start: 2021-08-05 | End: 2021-09-10

## 2021-08-05 ASSESSMENT — ENCOUNTER SYMPTOMS
JOINT SWELLING: 0
ARTHRALGIAS: 1

## 2021-08-05 NOTE — ED PROVIDER NOTES
History   Chief Complaint:  Foot Pain (here for rule out DVT. pts L foot is swollen and warm. pedal pulses and sensation intact. )       The history is provided by the patient.      Vera Arellano is a 60 year old female with history of hypertension who presents with left foot pain. Over the past few days, the patient has noticed that her left foot is swollen and warm. She has been attempting to treat it with elevation; however, today after work she noticed it was more swollen, tender, and warm and thus prompting her ED visit tonight. She also states that she feels her blood vessels in her foot have been protruding and pulsating. The patient does not have any person history of blood clots but her son recently had one diagnosed.     Review of Systems   Musculoskeletal: Positive for arthralgias (left foot pain and swelling). Negative for joint swelling (no ankle swelling).   10 systems reviewed and negative except as above and in HPI.    Allergies:  Codeine  Morphine  Percocet [Oxycodone-Acetaminophen]  Lisinopril    Medications:  Hydrochlorothiazide  Losartan    Past Medical History:    Abnormal liver function test  Obesity  Shift work sleep disorder  Hypertension     Past Surgical History:    Right wrist ganglion cyst removal  Bunionectomy  Lasik  Strabismus      Family History:    Mother: asthma, hypertension  Father: COPD  Sister: suicide, dipolar disorder  Brother: COPD    Social History:  The patient presents to the ED alone. She has a 25 year old son.     Physical Exam     Patient Vitals for the past 24 hrs:   BP Temp Temp src Pulse Resp SpO2   08/05/21 1349 (!) 193/125 98.7  F (37.1  C) Temporal 86 18 99 %       Physical Exam    General: Alert, No distress. Nontoxic appearance  Head: No signs of trauma.   Mouth/Throat: Oropharynx moist.   Eyes: Conjunctivae are normal. Pupils are equal..   Neck: Normal range of motion.    CV: Appears well perfused.  Resp:No respiratory distress.   MSK: Left foot with  redness and tenderness on the dorsum of the foot from the base of the third toe to the ankle. No bony tenderness to palpation. No calf tenderness to palpation.   Neuro: The patient is alert and interactive. VO. Speech normal. GCS 15  Skin: No lesion or sign of trauma noted.   Psych: normal mood and affect. behavior is normal.     Emergency Department Course   Imaging:  Foot XR, G/E 3 views, left  Mild bunion deformity and moderate first MTP degenerative  changes. Moderate plantar and posterior calcaneal spurs. No evidence  of acute fracture.  As per radiology.     US Lower Extremity Venous Duplex Left  No deep venous thrombosis in the left lower extremity.  As per radiology.       Emergency Department Course:    Reviewed:  I reviewed nursing notes, vitals, past medical history and care everywhere    Assessments:  1606 I obtained history and examined the patient as noted above.     1656 I rechecked the patient and explained findings.     Disposition:  The patient was discharged to home.       Impression & Plan       Medical Decision Making:  Vera Aerllano is a 60 year old female who presents for evaluation of left foot pain and redness.  The history, physical exam and supporting data are consistent with cellulitis.  There do not appear at this time to be any complication of cellulitis including necrotizing fascitis, lymphangitis, lymphadenitis, abscess, osteomyelitis, sepsis, or shock. X-ray of her foot did not show any signs of fracture. Ultrasound was negative for DVT. The patient is not immunosuppressed.  Supportive outpatient management is indicated with antibiotics.  Close follow-up of primary care physician to ensure no progression and rapid resolution.  Area of cellulitis was outlined.        Covid-19  Vera Arellano was evaluated during a global COVID-19 pandemic, which necessitated consideration that the patient might be at risk for infection with the SARS-CoV-2 virus that causes COVID-19.   Applicable  protocols for evaluation were followed during the patient's care.     Diagnosis:    ICD-10-CM    1. Cellulitis of left lower extremity  L03.116        Discharge Medications:  New Prescriptions    CEPHALEXIN (KEFLEX) 500 MG CAPSULE    Take 1 capsule (500 mg) by mouth 4 times daily for 10 days       Scribe Disclosure:  I, Arely Delgado, am serving as a scribe at 4:05 PM on 8/5/2021 to document services personally performed by Naheed Anderson MD based on my observations and the provider's statements to me.     [facility]         Naheed Anderson MD  08/05/21 1667

## 2021-08-05 NOTE — TELEPHONE ENCOUNTER
Upon review patient hasn't been seen for her blood pressure in more than 1 year.  Losartan prescription changed to fill for only quantity #30 until she is seen in clinic.  Spoke with pharmacy staff who will cancel the #90 prescription sent in earlier today.  Anastasiia Jean RN, BSN  [facility]  08/05/21  4:26 PM

## 2021-08-05 NOTE — TELEPHONE ENCOUNTER
Last time prescribed: 11/12/19 , 90 tabs/caps x 3 refills  Last office visit: 12/9/19  Next appointment: No Future Appointment Scheduled  Last fill per pharmacy: 9/3/20

## 2021-08-05 NOTE — ED TRIAGE NOTES
Pt reports that for the past week her L foot has been painful and swollen and warm. Here for rule out DVT from urgent care.

## 2021-08-05 NOTE — TELEPHONE ENCOUNTER
Vital Signs 11/12/2019 11/12/2019 12/9/2019   Systolic 142 136 130   Diastolic 86 88 92     Medication is being filled for 1 time refill only due to:  Patient needs labs Hep C, Hep B, Hepatic panel, GGT, Gerritin, Iron and TIBC. Future labs ordered yes. Patient needs to be seen because due for annual exam.    [employer] message sent requesting she call scheduling to make an annual exam.    Anastasiia Jean RN, BSN  [facility]  08/05/21  1:34 PM

## 2021-08-31 ENCOUNTER — LAB (OUTPATIENT)
Dept: LAB | Facility: CLINIC | Age: 61
End: 2021-08-31
Attending: FAMILY MEDICINE
Payer: COMMERCIAL

## 2021-08-31 ENCOUNTER — VIRTUAL VISIT (OUTPATIENT)
Dept: FAMILY MEDICINE | Facility: CLINIC | Age: 61
End: 2021-08-31
Payer: COMMERCIAL

## 2021-08-31 DIAGNOSIS — Z20.822 EXPOSURE TO COVID-19 VIRUS: Primary | ICD-10-CM

## 2021-08-31 DIAGNOSIS — Z20.822 EXPOSURE TO COVID-19 VIRUS: ICD-10-CM

## 2021-08-31 PROCEDURE — 36415 COLL VENOUS BLD VENIPUNCTURE: CPT

## 2021-08-31 PROCEDURE — 86769 SARS-COV-2 COVID-19 ANTIBODY: CPT

## 2021-08-31 NOTE — PROGRESS NOTES
"Vera is a 61 year old who is being evaluated via a billable video visit.      How would you like to obtain your AVS? MyChart  If the video visit is dropped, the invitation should be resent by: Send to e-mail at: [email or web address]  Will anyone else be joining your video visit? No      Video Start Time: 3:00 PM    Assessment & Plan   Problem List Items Addressed This Visit     None      Visit Diagnoses     Exposure to COVID-19 virus    -  Primary    Relevant Orders    COVID-19 [place] R[facility] [employer] Reflex         Advised patient that I believe [employer] is requiring flu shots and COVID vaccinations. Advised that I did not think a positive antibody test would constitute grounds for refusing the COVID vaccine. I reviewed patient's chart and I do not see any justifiable cause for patient not to receive the COVID vaccine. Patient would like to pursue antibody testing regardless. Orders placed as noted above.     27 minutes spent on the date of the encounter doing chart review, history and exam, documentation and further activities as noted above.    Keyon Mcgill MD  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  - she knows several people who have gotten very ill, she believes from getting the vaccine  - \"people are dying\" from getting the vaccine  - she knows two pregnant women who had spontaneous abortions after getting the vaccine  - she also does not believe masks are effective at stopping the spread of COVID  - she also does not want to get the flu vaccine  - she has been told that [employer] is requiring all employees to get both the flu shot and the COVID vaccine  - she is hoping if she tests positive for antibodies that will mean she does not have to be vaccinated  - she works as a nurse and " believes she has been exposed to COVID several times in the past  - she denies any previous confirmatory testing for COVID    Review of Systems   Constitutional, HEENT, cardiovascular, pulmonary, gi and gu systems are negative, except as otherwise noted.      Objective    Vitals - Patient Reported  Weight (Patient Reported): 86.2 kg (190 lb)        Physical Exam   GENERAL: Healthy, alert and no distress  EYES: Eyes grossly normal to inspection.  No discharge or erythema, or obvious scleral/conjunctival abnormalities.  RESP: No audible wheeze, cough, or visible cyanosis.  No visible retractions or increased work of breathing.    SKIN: Visible skin clear. No significant rash, abnormal pigmentation or lesions.  NEURO: Cranial nerves grossly intact.  Mentation and speech appropriate for age.  PSYCH: Mentation appears normal, affect normal/bright, judgement and insight intact, normal speech and appearance well-groomed.      Video-Visit Details    Type of service:  Video Visit    Video End Time:3:12 PM    Originating Location (pt. Location): Home    Distant Location (provider location):  [facility]     Platform used for Video Visit: [employer]

## 2021-09-01 LAB
SARS-COV-2 AB SERPL IA-ACNC: <0.4 U/ML
SARS-COV-2 AB SERPL QL IA: NEGATIVE

## 2021-09-09 DIAGNOSIS — I10 BENIGN ESSENTIAL HYPERTENSION: ICD-10-CM

## 2021-09-10 ENCOUNTER — TELEPHONE (OUTPATIENT)
Dept: FAMILY MEDICINE | Facility: CLINIC | Age: 61
End: 2021-09-10

## 2021-09-10 RX ORDER — LOSARTAN POTASSIUM 100 MG/1
100 TABLET ORAL DAILY
Qty: 15 TABLET | Refills: 0 | Status: SHIPPED | OUTPATIENT
Start: 2021-09-10 | End: 2021-11-18

## 2021-09-10 NOTE — TELEPHONE ENCOUNTER
Losartan (Cozaar) 100mg    Last Office Visit: 8/31/21  Future [facility] Appointments: None  Medication last refilled: 8/5/21 #30 with 0 refill(s)    Vital Signs 11/12/2019 12/9/2019 8/5/2021   Systolic 136 130 193   Diastolic 88 92 125     Required labs per protocol:     Ref Range & Units 5/19/17 11/12/19   Potassium 3.4 - 5.3 mmol/L 4.0 3.6   Creatinine 0.6 - 1.3 mg/dL 0.92 0.8      [employer] message sent to patient to schedule a BP follow-up appointment in clinic as she needs labs as well and she hasn't read it.  Will ask the schedulers to reach out to her to schedule an appointment.      Anastasiia Jean, RN, BSN

## 2021-09-10 NOTE — TELEPHONE ENCOUNTER
----- Message from Anastasiia Jean RN sent at 9/10/2021  3:17 PM CDT -----  Regarding: Marilyn call patient to schedule appointment with Ashley Frye  Vera is overdue for an in-clinic blood pressure and lab follow-up visit.  She was sent a [employer] message requesting she call to schedule back on 8/5/21, which she did not read.  She had an unrelated virtual visit with Keyon since then, but she needs an in-clinic visit with Ashley the week she is back in town.  I am refilling her medication for a 2 week supply.  Please call her today if possible.  Thanks,  MARTI Laurent

## 2021-10-02 ENCOUNTER — HEALTH MAINTENANCE LETTER (OUTPATIENT)
Age: 61
End: 2021-10-02

## 2021-11-16 ENCOUNTER — APPOINTMENT (OUTPATIENT)
Dept: URGENT CARE | Facility: URGENT CARE | Age: 61
End: 2021-11-16
Payer: COMMERCIAL

## 2021-11-16 ENCOUNTER — LAB REQUISITION (OUTPATIENT)
Dept: LAB | Facility: CLINIC | Age: 61
End: 2021-11-16

## 2021-11-16 LAB — SARS-COV-2 RNA RESP QL NAA+PROBE: NEGATIVE

## 2021-11-16 PROCEDURE — U0003 INFECTIOUS AGENT DETECTION BY NUCLEIC ACID (DNA OR RNA); SEVERE ACUTE RESPIRATORY SYNDROME CORONAVIRUS 2 (SARS-COV-2) (CORONAVIRUS DISEASE [COVID-19]), AMPLIFIED PROBE TECHNIQUE, MAKING USE OF HIGH THROUGHPUT TECHNOLOGIES AS DESCRIBED BY CMS-2020-01-R: HCPCS | Performed by: INTERNAL MEDICINE

## 2021-11-18 ENCOUNTER — VIRTUAL VISIT (OUTPATIENT)
Dept: FAMILY MEDICINE | Facility: CLINIC | Age: 61
End: 2021-11-18
Payer: COMMERCIAL

## 2021-11-18 DIAGNOSIS — R05.9 COUGH: ICD-10-CM

## 2021-11-18 DIAGNOSIS — J11.1 INFLUENZA-LIKE ILLNESS: Primary | ICD-10-CM

## 2021-11-18 DIAGNOSIS — I10 BENIGN ESSENTIAL HYPERTENSION: ICD-10-CM

## 2021-11-18 RX ORDER — HYDROCHLOROTHIAZIDE 12.5 MG/1
12.5 TABLET ORAL DAILY
Qty: 30 TABLET | Refills: 0 | Status: SHIPPED | OUTPATIENT
Start: 2021-11-18

## 2021-11-18 RX ORDER — BENZONATATE 200 MG/1
200 CAPSULE ORAL 3 TIMES DAILY PRN
Qty: 30 CAPSULE | Refills: 0 | Status: SHIPPED | OUTPATIENT
Start: 2021-11-18

## 2021-11-18 RX ORDER — LOSARTAN POTASSIUM 100 MG/1
100 TABLET ORAL DAILY
Qty: 30 TABLET | Refills: 0 | Status: SHIPPED | OUTPATIENT
Start: 2021-11-18

## 2021-11-18 NOTE — PROGRESS NOTES
Vera is a 61 year old who is being evaluated via a billable video visit.      How would you like to obtain your AVS? Mail a copy  If the video visit is dropped, the invitation should be resent by: Text to cell phone: [phone]  Will anyone else be joining your video visit? No      Video Start Time: 9:57AM    Assessment & Plan     Influenza-like illness  Symptomatic care discussed. See patient instructions.  Note for work written. Cough will likely persist for additional 1-2 weeks.    Cough  - benzonatate (TESSALON) 200 MG capsule; Take 1 capsule (200 mg) by mouth 3 times daily as needed for cough    Benign essential hypertension  - losartan (COZAAR) 100 MG tablet; Take 1 tablet (100 mg) by mouth daily  - hydrochlorothiazide (HYDRODIURIL) 12.5 MG tablet; Take 1 tablet (12.5 mg) by mouth daily  Patient has been without her blood pressure medicine for at least 2 months.  She is due for lab tests as well.  Last blood pressure in our system very high at 193/125.  This was with an acute visit to the emergency room for cellulitis.  -She reassures me that she will make an appointment within the next month for blood pressure recheck and labs.  She has seen Dr. Mcgill in the past and will make a follow-up appointment with him.      33 minutes spent on the date of the encounter doing chart review, history and exam, documentation and further activities per the note         Return in 2 weeks (on 12/2/2021) for Follow up, in person with new provider for BP check and labs.    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  She started with cold symptoms November 13, 2021.  Symptoms got significantly worse over the next 2 days.  She had a negative Covid test 11/16/2021 through [facility].  Results were negative.    She currently is  complaining primarily of persistent dry hacking cough.  Also head congestion, marked fatigue, headache and body aches.  She made the appointment today because she needs a work note.   Acute illness concerns:   Onset/Duration: 11/15  Symptoms:  Fever: no  Chills/Sweats: YES  Headache (location?): YES  Sinus Pressure: YES  Conjunctivitis:  no  Ear Pain: no  Rhinorrhea: YES  Congestion: YES  Sore Throat: YES  Cough: YES-dry hacky at times unrelenting cough  Wheeze: no--Ho history of asthma or reactive airway disease  Decreased Appetite: YES  Nausea: no  Vomiting: no  Diarrhea: no  Dysuria/Freq.: no  Dysuria or Hematuria: no  Fatigue/Achiness: YES  Sick/Strep Exposure: YES- works at hospital  Therapies tried and outcome: ibuprofen, fluids    Hypertension Follow-up: Patient denies symptoms associated with high blood pressure including blurred vision, headache, chest pain, heart palpitations, shortness of breath and pedal edema.  Last noted blood pressure within our system is 193/125.  Granted this was during an emergency room visit for cellulitis.  She admits that she has been noncompliant of her medications.  She ran out of medicine 6 weeks ago.  We had given extensions on her medications and encouraged her to come in for a follow-up appointment.  She also readily admits that she really does not like taking medication.  I last saw her specifically for her hypertension in 2019.    Do you check your blood pressure regularly outside of the clinic? No     Are you following a low salt diet? No    Are your blood pressures ever more than 140 on the top number (systolic) OR more   than 90 on the bottom number (diastolic), for example 140/90? Yes      Review of Systems         Objective           Vitals:  No vitals were obtained today due to virtual visit.    Physical Exam   GENERAL: Healthy, alert and no distress  EYES: Eyes grossly normal to inspection.  No discharge or erythema, or obvious scleral/conjunctival  abnormalities.  RESP: No audible wheeze but cough is dry hacky and persistent.  SKIN: Visible skin clear. No significant rash, abnormal pigmentation or lesions.  NEURO: Cranial nerves grossly intact.  Mentation and speech appropriate for age.  PSYCH: Mentation appears normal, affect normal/bright, judgement and insight intact, normal speech and appearance well-groomed.                Video-Visit Details    Type of service:  Video Visit    Video End Time:10:25 AM    Originating Location (pt. Location): Home    Distant Location (provider location):  [facility]     Platform used for Video Visit: [employer]

## 2021-11-18 NOTE — LETTER
November 18, 2021      Re: Vera Arellano  [address] [facility] [address]        The above is a patient at the [facility] and she was seen and treated on this date for a flu like illness.  She tested negative for COVID by PCR 11/16/2021.  She is struggling with persistent fatigue, nasal congestion and tight dry cough.  It may take another 3-4 days for resolutions.    Plan is to return to work 11/24/2021.       Sincerely,        Saba Rose PA-C

## 2021-11-18 NOTE — PATIENT INSTRUCTIONS
Patient Education     Influenza (Adult)    Influenza is also called the flu. It's a viral illness that affects the air passages of your lungs. It's different from the common cold. The flu can easily be passed from one to person to another. It may be spread through the air by coughing and sneezing. Or it can be spread by touching the sick person and then touching your own eyes, nose, or mouth.   The flu starts 1 to 3 days after you are exposed to the flu virus. It may last for 1 to 2 weeks but sometimes people feel tired or fatigued for many weeks afterward. You usually don t need to take antibiotics unless you are at high risk for or have a complication . This might be an ear or sinus infection or pneumonia.   Symptoms of the flu may be mild or severe. They can include extreme tiredness (wanting to stay in bed all day), chills, fevers, muscle aches, soreness with eye movement, headache, and a dry, hacking cough.   Antiviral medicine for the flu is available by prescription. If you start taking it within 48 hours, it may help reduce how long your symptoms last and how severe they are. Your provider may do a test to find out if you have influenza and which strain you have.   Home care  Follow these guidelines when caring for yourself at home:    Stay away from cigarette smoke, whether yours or other people s.    Acetaminophen or ibuprofen will help ease your fever, muscle aches, and headache. Don t give aspirin to anyone younger than 18 who has the flu. This can cause a serious condition called Reye syndrome.    Nausea, loose stools, and loss of appetite are common with the flu. Eat light meals. Drink 6 to 8 glasses of liquids every day. Good choices are water, sport drinks, soft drinks without caffeine, juices, tea, and soup. Extra fluids will also help loosen secretions in your nose and lungs.    Over-the-counter cold medicines will not make the flu go away faster. But the medicines may help with coughing, sore  throat, and congestion in your nose and sinuses. Don t use a decongestant if you have high blood pressure.    Stay home until your fever has been gone for at least 24 hours without using medicine to reduce fever.  Follow-up care  Follow up with your healthcare provider, or as advised, if you are not getting better over the next week.   If you are age 65 or older, talk with your provider about getting a pneumococcal vaccine every 5 years. You should also get this vaccine if you have chronic asthma or COPD. All adults should get a flu vaccine every fall. Ask your provider about this.   When to seek medical advice  Call your healthcare provider right away if you have the flu and any of these occur:     Cough with lots of colored mucus (sputum) or blood in your mucus    Chest pain, shortness of breath, wheezing, or trouble breathing    Severe headache, or face, neck, or ear pain    New rash with fever    Fever of 100.4 F (38 C) or higher, or as directed by your healthcare provider    Confusion, behavior change, or seizure    Severe weakness or dizziness    You get a new fever or cough after getting better for a few days  Also call your provider if you have flu symptoms and have a weakened immune system or are taking medicines that can weaken your immune system. These include steroids and certain anti-inflammatory medicines.   [employer] last reviewed this educational content on 10/1/2019    0106-8392 [employer]. All rights reserved. This information is not intended as a substitute for professional medical care. Always follow your healthcare professional's instructions.

## 2022-01-22 ENCOUNTER — HEALTH MAINTENANCE LETTER (OUTPATIENT)
Age: 62
End: 2022-01-22

## 2022-03-19 ENCOUNTER — HEALTH MAINTENANCE LETTER (OUTPATIENT)
Age: 62
End: 2022-03-19

## 2022-09-03 ENCOUNTER — HEALTH MAINTENANCE LETTER (OUTPATIENT)
Age: 62
End: 2022-09-03

## 2023-04-29 ENCOUNTER — HEALTH MAINTENANCE LETTER (OUTPATIENT)
Age: 63
End: 2023-04-29

## 2024-04-27 ENCOUNTER — HEALTH MAINTENANCE LETTER (OUTPATIENT)
Age: 64
End: 2024-04-27

## 2024-07-06 ENCOUNTER — HEALTH MAINTENANCE LETTER (OUTPATIENT)
Age: 64
End: 2024-07-06